# Patient Record
Sex: FEMALE | Race: NATIVE HAWAIIAN OR OTHER PACIFIC ISLANDER | Employment: UNEMPLOYED | URBAN - METROPOLITAN AREA
[De-identification: names, ages, dates, MRNs, and addresses within clinical notes are randomized per-mention and may not be internally consistent; named-entity substitution may affect disease eponyms.]

---

## 2017-07-29 ENCOUNTER — HOSPITAL ENCOUNTER (EMERGENCY)
Age: 22
Discharge: HOME | End: 2017-07-29
Attending: EMERGENCY MEDICINE
Payer: COMMERCIAL

## 2017-07-29 ENCOUNTER — APPOINTMENT (OUTPATIENT)
Dept: GENERAL RADIOLOGY | Age: 22
End: 2017-07-29
Payer: COMMERCIAL

## 2017-07-29 VITALS
BODY MASS INDEX: 13.81 KG/M2 | TEMPERATURE: 99.5 F | DIASTOLIC BLOOD PRESSURE: 71 MMHG | SYSTOLIC BLOOD PRESSURE: 125 MMHG | WEIGHT: 88 LBS | OXYGEN SATURATION: 100 % | HEART RATE: 96 BPM | RESPIRATION RATE: 16 BRPM | HEIGHT: 67 IN

## 2017-07-29 DIAGNOSIS — R07.89 CHEST WALL PAIN: Primary | ICD-10-CM

## 2017-07-29 PROCEDURE — 6370000000 HC RX 637 (ALT 250 FOR IP): Performed by: NURSE PRACTITIONER

## 2017-07-29 PROCEDURE — 71020 XR CHEST STANDARD TWO VW: CPT

## 2017-07-29 PROCEDURE — 99285 EMERGENCY DEPT VISIT HI MDM: CPT

## 2017-07-29 RX ORDER — IBUPROFEN 800 MG/1
800 TABLET ORAL EVERY 8 HOURS PRN
Qty: 20 TABLET | Refills: 0 | Status: ON HOLD | OUTPATIENT
Start: 2017-07-29 | End: 2018-06-18 | Stop reason: HOSPADM

## 2017-07-29 RX ORDER — HYDROCODONE BITARTRATE AND ACETAMINOPHEN 5; 325 MG/1; MG/1
1 TABLET ORAL EVERY 6 HOURS PRN
COMMUNITY
End: 2017-11-24 | Stop reason: CLARIF

## 2017-07-29 RX ORDER — OXYCODONE HYDROCHLORIDE AND ACETAMINOPHEN 5; 325 MG/1; MG/1
1 TABLET ORAL ONCE
Status: COMPLETED | OUTPATIENT
Start: 2017-07-29 | End: 2017-07-29

## 2017-07-29 RX ORDER — OXYCODONE HYDROCHLORIDE AND ACETAMINOPHEN 5; 325 MG/1; MG/1
1 TABLET ORAL EVERY 6 HOURS PRN
Qty: 10 TABLET | Refills: 0 | Status: SHIPPED | OUTPATIENT
Start: 2017-07-29 | End: 2017-11-24 | Stop reason: SINTOL

## 2017-07-29 RX ADMIN — OXYCODONE HYDROCHLORIDE AND ACETAMINOPHEN 1 TABLET: 5; 325 TABLET ORAL at 10:36

## 2017-07-29 ASSESSMENT — ENCOUNTER SYMPTOMS
SHORTNESS OF BREATH: 0
COUGH: 0
NAUSEA: 0
ABDOMINAL PAIN: 0
DIARRHEA: 0
VOMITING: 0

## 2017-07-29 ASSESSMENT — PAIN DESCRIPTION - FREQUENCY: FREQUENCY: CONTINUOUS

## 2017-07-29 ASSESSMENT — PAIN SCALES - GENERAL
PAINLEVEL_OUTOF10: 10
PAINLEVEL_OUTOF10: 10
PAINLEVEL_OUTOF10: 0

## 2017-07-29 ASSESSMENT — PAIN DESCRIPTION - DESCRIPTORS: DESCRIPTORS: CONSTANT

## 2017-07-29 ASSESSMENT — PAIN DESCRIPTION - PAIN TYPE: TYPE: ACUTE PAIN

## 2017-07-29 ASSESSMENT — PAIN DESCRIPTION - LOCATION: LOCATION: HEAD;NECK

## 2017-11-24 ENCOUNTER — APPOINTMENT (OUTPATIENT)
Dept: CT IMAGING | Age: 22
End: 2017-11-24
Payer: COMMERCIAL

## 2017-11-24 ENCOUNTER — HOSPITAL ENCOUNTER (EMERGENCY)
Age: 22
Discharge: HOME | End: 2017-11-24
Attending: EMERGENCY MEDICINE
Payer: COMMERCIAL

## 2017-11-24 VITALS
SYSTOLIC BLOOD PRESSURE: 101 MMHG | OXYGEN SATURATION: 100 % | TEMPERATURE: 99.4 F | RESPIRATION RATE: 16 BRPM | WEIGHT: 95 LBS | HEART RATE: 75 BPM | HEIGHT: 67 IN | DIASTOLIC BLOOD PRESSURE: 67 MMHG | BODY MASS INDEX: 14.91 KG/M2

## 2017-11-24 DIAGNOSIS — S05.00XA CORNEAL ABRASION, UNSPECIFIED LATERALITY, INITIAL ENCOUNTER: Primary | ICD-10-CM

## 2017-11-24 PROCEDURE — 99283 EMERGENCY DEPT VISIT LOW MDM: CPT

## 2017-11-24 PROCEDURE — 6370000000 HC RX 637 (ALT 250 FOR IP): Performed by: EMERGENCY MEDICINE

## 2017-11-24 PROCEDURE — 70480 CT ORBIT/EAR/FOSSA W/O DYE: CPT

## 2017-11-24 RX ORDER — POLYMYXIN B SULFATE AND TRIMETHOPRIM 1; 10000 MG/ML; [USP'U]/ML
1 SOLUTION OPHTHALMIC EVERY 4 HOURS
Qty: 1 BOTTLE | Refills: 0 | Status: SHIPPED | OUTPATIENT
Start: 2017-11-24 | End: 2017-12-04

## 2017-11-24 RX ORDER — OXYCODONE HYDROCHLORIDE AND ACETAMINOPHEN 5; 325 MG/1; MG/1
1 TABLET ORAL ONCE
Status: COMPLETED | OUTPATIENT
Start: 2017-11-24 | End: 2017-11-24

## 2017-11-24 RX ORDER — HYDROCODONE BITARTRATE AND ACETAMINOPHEN 5; 325 MG/1; MG/1
1 TABLET ORAL EVERY 6 HOURS PRN
Qty: 10 TABLET | Refills: 0 | Status: SHIPPED | OUTPATIENT
Start: 2017-11-24 | End: 2017-11-27

## 2017-11-24 RX ORDER — PROPARACAINE HYDROCHLORIDE 5 MG/ML
1 SOLUTION/ DROPS OPHTHALMIC ONCE
Status: DISCONTINUED | OUTPATIENT
Start: 2017-11-24 | End: 2017-11-24

## 2017-11-24 RX ORDER — TETRACAINE HYDROCHLORIDE 5 MG/ML
1 SOLUTION OPHTHALMIC ONCE
Status: COMPLETED | OUTPATIENT
Start: 2017-11-24 | End: 2017-11-24

## 2017-11-24 RX ADMIN — TETRACAINE HYDROCHLORIDE 1 DROP: 5 SOLUTION OPHTHALMIC at 17:27

## 2017-11-24 RX ADMIN — FLUORESCEIN SODIUM 1 MG: 1 STRIP OPHTHALMIC at 17:27

## 2017-11-24 RX ADMIN — OXYCODONE HYDROCHLORIDE AND ACETAMINOPHEN 1 TABLET: 5; 325 TABLET ORAL at 17:45

## 2017-11-24 ASSESSMENT — PAIN DESCRIPTION - ORIENTATION: ORIENTATION: LEFT

## 2017-11-24 ASSESSMENT — PAIN DESCRIPTION - DESCRIPTORS: DESCRIPTORS: THROBBING;STABBING

## 2017-11-24 ASSESSMENT — PAIN DESCRIPTION - PAIN TYPE: TYPE: ACUTE PAIN

## 2017-11-24 ASSESSMENT — PAIN DESCRIPTION - LOCATION: LOCATION: EYE

## 2017-11-24 ASSESSMENT — ENCOUNTER SYMPTOMS
SORE THROAT: 0
DIARRHEA: 0
ABDOMINAL PAIN: 0
VOMITING: 0
BACK PAIN: 0
EYE PAIN: 1
COUGH: 0
NAUSEA: 0
SHORTNESS OF BREATH: 0

## 2017-11-24 ASSESSMENT — PAIN SCALES - GENERAL
PAINLEVEL_OUTOF10: 8
PAINLEVEL_OUTOF10: 8

## 2017-11-24 ASSESSMENT — PAIN DESCRIPTION - FREQUENCY: FREQUENCY: CONTINUOUS
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
## 2017-12-23 ENCOUNTER — HOSPITAL ENCOUNTER (EMERGENCY)
Age: 22
Discharge: HOME | End: 2017-12-23
Attending: EMERGENCY MEDICINE
Payer: COMMERCIAL

## 2017-12-23 VITALS
RESPIRATION RATE: 14 BRPM | OXYGEN SATURATION: 100 % | HEIGHT: 66 IN | BODY MASS INDEX: 15.11 KG/M2 | WEIGHT: 94 LBS | SYSTOLIC BLOOD PRESSURE: 127 MMHG | TEMPERATURE: 98.6 F | DIASTOLIC BLOOD PRESSURE: 54 MMHG | HEART RATE: 109 BPM

## 2017-12-23 DIAGNOSIS — T78.40XA ALLERGIC REACTION, INITIAL ENCOUNTER: Primary | ICD-10-CM

## 2017-12-23 PROCEDURE — 99282 EMERGENCY DEPT VISIT SF MDM: CPT

## 2017-12-23 PROCEDURE — 6370000000 HC RX 637 (ALT 250 FOR IP): Performed by: PHYSICIAN ASSISTANT

## 2017-12-23 RX ORDER — CETIRIZINE HYDROCHLORIDE 10 MG/1
10 TABLET ORAL DAILY
Qty: 7 TABLET | Refills: 0 | Status: SHIPPED | OUTPATIENT
Start: 2017-12-23 | End: 2017-12-30

## 2017-12-23 RX ORDER — METHYLPREDNISOLONE SODIUM SUCCINATE 125 MG/2ML
125 INJECTION, POWDER, LYOPHILIZED, FOR SOLUTION INTRAMUSCULAR; INTRAVENOUS ONCE
Status: DISCONTINUED | OUTPATIENT
Start: 2017-12-23 | End: 2017-12-23

## 2017-12-23 RX ORDER — CETIRIZINE HYDROCHLORIDE 10 MG/1
10 TABLET ORAL DAILY
Status: DISCONTINUED | OUTPATIENT
Start: 2017-12-23 | End: 2017-12-23 | Stop reason: HOSPADM

## 2017-12-23 RX ADMIN — CETIRIZINE HYDROCHLORIDE 10 MG: 10 TABLET, FILM COATED ORAL at 15:56

## 2017-12-23 ASSESSMENT — ENCOUNTER SYMPTOMS
BACK PAIN: 0
SORE THROAT: 0
VOMITING: 0
CHOKING: 0
ABDOMINAL DISTENTION: 0
SHORTNESS OF BREATH: 0
NAUSEA: 0
CONSTIPATION: 0
WHEEZING: 0
STRIDOR: 0
ABDOMINAL PAIN: 0
COLOR CHANGE: 0
COUGH: 0
RHINORRHEA: 0
EYE DISCHARGE: 0

## 2017-12-23 NOTE — ED PROVIDER NOTES
[address] [facility] ED  eMERGENCY dEPARTMENT eNCOUnter      Pt Name: Carissa Ascencio  MRN: [id number]  [place] 1995  Date of evaluation: 12/23/2017  Provider: Mayelin Corbett PA-C    CHIEF COMPLAINT       Chief Complaint   Patient presents with    Urticaria     states on/off for 2 years. This episode began approx 2 weeks ago. States keeps getting worse. Denies any resp distress. HISTORY OF PRESENT ILLNESS   (Location/Symptom, Timing/Onset, Context/Setting, Quality, Duration, Modifying Factors, Severity)  Note limiting factors. Carissa Ascencio is a 23 y.o. female who presents to the emergency department With the complaint of rash which she's had on her entire body for approximately last 2 weeks. She states she's had similar rash before in the past, which she states lasted for over 3 years she was seen by the by her regular family physician and a dermatologist but never followed up for final determination of cause. Patient states the rash returned 2 weeks ago she cannot think of anything new that she's done she's not using any new medications no new soaps no new lotions no new clothing or bedding. She denies any shortness of breath any dizziness any wheezing, but states she has a constant rash across her face and arms hands chest back and legs. She states she has no regular family physician at this time for follow-up. HPI    Nursing Notes were reviewed. REVIEW OF SYSTEMS    (2-9 systems for level 4, 10 or more for level 5)     Review of Systems   Constitutional: Negative for activity change, appetite change, diaphoresis and fatigue. HENT: Negative for congestion, ear discharge, ear pain, nosebleeds, rhinorrhea and sore throat. Eyes: Negative for discharge. Respiratory: Negative for cough, choking, shortness of breath, wheezing and stridor. Cardiovascular: Negative for chest pain, palpitations and leg swelling.    Gastrointestinal: Negative for abdominal distention, abdominal and well-nourished. HENT:   Head: Normocephalic. Eyes: Pupils are equal, round, and reactive to light. Neck: Neck supple. No JVD present. No tracheal deviation present. Cardiovascular: Normal rate. Pulmonary/Chest: Effort normal and breath sounds normal. No stridor. No respiratory distress. She has no wheezes. She has no rales. She exhibits no tenderness. Abdominal: Soft. Bowel sounds are normal. She exhibits no distension and no mass. There is no tenderness. There is no rebound and no guarding. Musculoskeletal: She exhibits no edema or deformity. Lymphadenopathy:     She has no cervical adenopathy. Neurological: She is oriented to person, place, and time. Coordination normal.   Skin: Skin is warm and dry. Patient has generalized rash across her entire body face hands neck chest back legs abdomen which she states been ongoing for 2 weeks. It is erythematous in nature and takes on the presentation of urticaria. There is no blistering there is no scaling there is no ulcerations   Psychiatric: She has a normal mood and affect. DIAGNOSTIC RESULTS     EKG: All EKG's are interpreted by the Emergency Department Physician who either signs or Co-signs this chart in the absence of a cardiologist.        RADIOLOGY:   Non-plain film images such as CT, Ultrasound and MRI are read by the radiologist. Plain radiographic images are visualized and preliminarily interpreted by the emergency physician with the below findings:        Interpretation per the Radiologist below, if available at the time of this note:    No orders to display         ED BEDSIDE ULTRASOUND:   Performed by ED Physician - none    LABS:  Labs Reviewed - No data to display    All other labs were within normal range or not returned as of this dictation.     EMERGENCY DEPARTMENT COURSE and DIFFERENTIAL DIAGNOSIS/MDM:   Vitals:    Vitals:    12/23/17 1531   BP: (!) 127/54   Pulse: 109   Resp: 14   Temp: 98.6 °F (37 °C)   TempSrc: Oral SpO2: 100%   Weight: 94 lb (42.6 kg)   Height: 5' 6\" (1.676 m)         ED Course      MDM  Number of Diagnoses or Management Options  Allergic reaction, initial encounter:   Diagnosis management comments: Patient is undergoing rash which she's had for last 2 weeks she's had similar before in the past which lasted according the patient over 3 years. She also states that she is approximately 5 weeks pregnant at this point. She states she initially had seen a dermatologist never followed up for final diagnosis. She was given Zyrtec in the Emergency department . And will be discharged home with Zyrtec and advised to follow-up with Dr. Laina Mendoza  of immunology for further allergy testing. CRITICAL CARE TIME   Total Critical Care time was 0 minutes, excluding separately reportable procedures. There was a high probability of clinically significant/life threatening deterioration in the patient's condition which required my urgent intervention. CONSULTS:  None    PROCEDURES:  Unless otherwise noted below, none     Procedures    FINAL IMPRESSION      1.  Allergic reaction, initial encounter          DISPOSITION/PLAN   DISPOSITION        PATIENT REFERRED TO:  Anayeli Anne MD  [facility] [address] [phone]    In 1 week        DISCHARGE MEDICATIONS:  New Prescriptions    CETIRIZINE (ZYRTEC) 10 MG TABLET    Take 1 tablet by mouth daily for 7 days          (Please note that portions of this note were completed with a voice recognition program.  Efforts were made to edit the dictations but occasionally words are mis-transcribed.)    Yonathan Ward PA-C (electronically signed)  Attending Emergency Physician         Yonathan Ward PA-C  12/23/17 [address] [facility] Elvia Lewis PA-C  12/23/17 4473

## 2017-12-27 ENCOUNTER — HOSPITAL ENCOUNTER (OUTPATIENT)
Dept: ULTRASOUND IMAGING | Age: 22
Discharge: HOME | End: 2017-12-27
Payer: COMMERCIAL

## 2017-12-27 DIAGNOSIS — Z34.91 ENCOUNTER FOR SUPERVISION OF NORMAL PREGNANCY IN FIRST TRIMESTER, UNSPECIFIED GRAVIDITY: ICD-10-CM

## 2017-12-27 PROCEDURE — 76801 OB US < 14 WKS SINGLE FETUS: CPT

## 2018-01-15 ENCOUNTER — APPOINTMENT (OUTPATIENT)
Dept: ULTRASOUND IMAGING | Age: 23
End: 2018-01-15
Payer: COMMERCIAL

## 2018-01-15 ENCOUNTER — HOSPITAL ENCOUNTER (EMERGENCY)
Age: 23
Discharge: HOME | End: 2018-01-15
Attending: EMERGENCY MEDICINE
Payer: COMMERCIAL

## 2018-01-15 VITALS
SYSTOLIC BLOOD PRESSURE: 117 MMHG | TEMPERATURE: 98 F | WEIGHT: 94 LBS | RESPIRATION RATE: 18 BRPM | DIASTOLIC BLOOD PRESSURE: 72 MMHG | OXYGEN SATURATION: 100 % | BODY MASS INDEX: 15.11 KG/M2 | HEIGHT: 66 IN | HEART RATE: 77 BPM

## 2018-01-15 DIAGNOSIS — O46.8X1 SUBCHORIONIC HEMATOMA IN FIRST TRIMESTER, SINGLE OR UNSPECIFIED FETUS: ICD-10-CM

## 2018-01-15 DIAGNOSIS — N93.9 VAGINAL BLEEDING: Primary | ICD-10-CM

## 2018-01-15 DIAGNOSIS — O41.8X10 SUBCHORIONIC HEMATOMA IN FIRST TRIMESTER, SINGLE OR UNSPECIFIED FETUS: ICD-10-CM

## 2018-01-15 LAB
ALBUMIN SERPL-MCNC: 4.8 G/DL (ref 3.9–4.9)
ALP BLD-CCNC: 90 U/L (ref 40–130)
ALT SERPL-CCNC: 15 U/L (ref 0–33)
ANION GAP SERPL CALCULATED.3IONS-SCNC: 16 MEQ/L (ref 7–13)
AST SERPL-CCNC: 16 U/L (ref 0–35)
BASOPHILS ABSOLUTE: 0 K/UL (ref 0–0.2)
BASOPHILS RELATIVE PERCENT: 0.5 %
BILIRUB SERPL-MCNC: 1 MG/DL (ref 0–1.2)
BUN BLDV-MCNC: 8 MG/DL (ref 6–20)
CALCIUM SERPL-MCNC: 9.8 MG/DL (ref 8.6–10.2)
CHLORIDE BLD-SCNC: 98 MEQ/L (ref 98–107)
CO2: 21 MEQ/L (ref 22–29)
CREAT SERPL-MCNC: 0.35 MG/DL (ref 0.5–0.9)
EOSINOPHILS ABSOLUTE: 0 K/UL (ref 0–0.7)
EOSINOPHILS RELATIVE PERCENT: 0.4 %
GFR AFRICAN AMERICAN: >60
GFR NON-AFRICAN AMERICAN: >60
GLOBULIN: 3.2 G/DL (ref 2.3–3.5)
GLUCOSE BLD-MCNC: 87 MG/DL (ref 74–109)
GONADOTROPIN, CHORIONIC (HCG) QUANT: NORMAL MIU/ML
HCT VFR BLD CALC: 39.6 % (ref 37–47)
HEMOGLOBIN: 12.9 G/DL (ref 12–16)
LYMPHOCYTES ABSOLUTE: 1.4 K/UL (ref 1–4.8)
LYMPHOCYTES RELATIVE PERCENT: 19.7 %
MCH RBC QN AUTO: 26.9 PG (ref 27–31.3)
MCHC RBC AUTO-ENTMCNC: 32.6 % (ref 33–37)
MCV RBC AUTO: 82.6 FL (ref 82–100)
MONOCYTES ABSOLUTE: 0.5 K/UL (ref 0.2–0.8)
MONOCYTES RELATIVE PERCENT: 6.9 %
NEUTROPHILS ABSOLUTE: 5.2 K/UL (ref 1.4–6.5)
NEUTROPHILS RELATIVE PERCENT: 72.5 %
PDW BLD-RTO: 18.8 % (ref 11.5–14.5)
PLATELET # BLD: 328 K/UL (ref 130–400)
POTASSIUM SERPL-SCNC: 4.6 MEQ/L (ref 3.5–5.1)
RBC # BLD: 4.79 M/UL (ref 4.2–5.4)
SODIUM BLD-SCNC: 135 MEQ/L (ref 132–144)
TOTAL PROTEIN: 8 G/DL (ref 6.4–8.1)
WBC # BLD: 7.1 K/UL (ref 4.5–11)

## 2018-01-15 PROCEDURE — 99284 EMERGENCY DEPT VISIT MOD MDM: CPT

## 2018-01-15 PROCEDURE — 76817 TRANSVAGINAL US OBSTETRIC: CPT

## 2018-01-15 PROCEDURE — 2580000003 HC RX 258: Performed by: EMERGENCY MEDICINE

## 2018-01-15 PROCEDURE — 76801 OB US < 14 WKS SINGLE FETUS: CPT

## 2018-01-15 PROCEDURE — 36415 COLL VENOUS BLD VENIPUNCTURE: CPT

## 2018-01-15 PROCEDURE — 80053 COMPREHEN METABOLIC PANEL: CPT

## 2018-01-15 PROCEDURE — 85025 COMPLETE CBC W/AUTO DIFF WBC: CPT

## 2018-01-15 PROCEDURE — 84702 CHORIONIC GONADOTROPIN TEST: CPT

## 2018-01-15 RX ORDER — 0.9 % SODIUM CHLORIDE 0.9 %
1000 INTRAVENOUS SOLUTION INTRAVENOUS ONCE
Status: COMPLETED | OUTPATIENT
Start: 2018-01-15 | End: 2018-01-15

## 2018-01-15 RX ORDER — SODIUM CHLORIDE 9 MG/ML
INJECTION, SOLUTION INTRAVENOUS
Status: DISCONTINUED
Start: 2018-01-15 | End: 2018-01-15 | Stop reason: HOSPADM

## 2018-01-15 RX ADMIN — SODIUM CHLORIDE 1000 ML: 9 INJECTION, SOLUTION INTRAVENOUS at 15:44

## 2018-01-15 ASSESSMENT — ENCOUNTER SYMPTOMS
VOMITING: 0
SHORTNESS OF BREATH: 0
NAUSEA: 0
SORE THROAT: 0
ABDOMINAL PAIN: 0
COUGH: 0
BACK PAIN: 0
DIARRHEA: 0

## 2018-01-15 NOTE — ED PROVIDER NOTES
status: Current Some Day Smoker     Types: Cigarettes, Cigars    Smokeless tobacco: Never Used      Comment: black n milds    Alcohol use No    Drug use: No    Sexual activity: Yes     Partners: Male     Other Topics Concern    None     Social History Narrative    None         PHYSICAL EXAM       ED Triage Vitals [01/15/18 1449]   BP Temp Temp Source Pulse Resp SpO2 Height Weight   125/81 98 °F (36.7 °C) Oral 88 16 100 % 5' 6\" (1.676 m) 94 lb (42.6 kg)       Physical Exam   Constitutional: She is oriented to person, place, and time. She appears well-developed. HENT:   Head: Normocephalic. Right Ear: External ear normal.   Left Ear: External ear normal.   Mouth/Throat: Oropharynx is clear and moist.   Eyes: Conjunctivae are normal. Pupils are equal, round, and reactive to light. Neck: Normal range of motion. Neck supple. Cardiovascular: Normal rate, regular rhythm and normal heart sounds. Pulmonary/Chest: Effort normal and breath sounds normal.   Abdominal: Soft. Bowel sounds are normal. She exhibits no distension. There is no tenderness. Musculoskeletal: Normal range of motion. Neurological: She is alert and oriented to person, place, and time. Skin: Skin is warm and dry. Psychiatric: She has a normal mood and affect. Nursing note and vitals reviewed. MDM  24 yo female presents to the ED with vaginal bleeding. Pt given 1 L NS. No ab cramping. Labs only remarkable for hcg [id number]. US shows single IUP at 10 wks 0 days with subchorionic hemorrhage. Pt educated about subchorionic hemorrhages, advised on bed rest, given vaginal bleeding in pregnancy warning signs and f/u with ob. Pt understands plan. FINAL IMPRESSION      1. Vaginal bleeding    2.  Subchorionic hematoma in first trimester, single or unspecified fetus          DISPOSITION/PLAN   DISPOSITION Decision To Discharge 01/15/2018 05:28:04 PM        DISCHARGE MEDICATIONS:  New Prescriptions    No medications on file

## 2018-01-15 NOTE — ED TRIAGE NOTES
Pt went to pcp for regular check up and during vaginal exam membrane and bleeding was found in vaginal cavity. Pt is a+o x4 msps intact lungs clear bilat. Pt denies any pain at this time. Skin warm pink and dry. Pt denies any recent fever chills cough or cold.

## 2018-05-18 ENCOUNTER — HOSPITAL ENCOUNTER (OUTPATIENT)
Age: 23
Setting detail: OBSERVATION
Discharge: HOME | End: 2018-05-20
Attending: OBSTETRICS & GYNECOLOGY | Admitting: OBSTETRICS & GYNECOLOGY
Payer: COMMERCIAL

## 2018-05-18 LAB
ABO/RH: NORMAL
ALBUMIN SERPL-MCNC: 3.2 G/DL (ref 3.9–4.9)
ALP BLD-CCNC: 132 U/L (ref 40–130)
ALT SERPL-CCNC: <5 U/L (ref 0–33)
AMORPHOUS: NORMAL
AMPHETAMINE SCREEN, URINE: NORMAL
ANION GAP SERPL CALCULATED.3IONS-SCNC: 16 MEQ/L (ref 7–13)
ANISOCYTOSIS: ABNORMAL
ANTIBODY SCREEN: NORMAL
APTT: 25.2 SEC (ref 21.6–35.4)
AST SERPL-CCNC: 12 U/L (ref 0–35)
BACTERIA: NORMAL /HPF
BANDED NEUTROPHILS RELATIVE PERCENT: 10 % (ref 5–11)
BARBITURATE SCREEN URINE: NORMAL
BASOPHILS ABSOLUTE: 0 K/UL (ref 0–0.2)
BASOPHILS RELATIVE PERCENT: 1 %
BENZODIAZEPINE SCREEN, URINE: NORMAL
BILIRUB SERPL-MCNC: <0.2 MG/DL (ref 0–1.2)
BILIRUBIN URINE: NEGATIVE
BLOOD, URINE: NEGATIVE
BUN BLDV-MCNC: 6 MG/DL (ref 6–20)
CALCIUM SERPL-MCNC: 8 MG/DL (ref 8.6–10.2)
CANNABINOID SCREEN URINE: NORMAL
CHLORIDE BLD-SCNC: 104 MEQ/L (ref 98–107)
CLARITY: CLEAR
CO2: 20 MEQ/L (ref 22–29)
COCAINE METABOLITE SCREEN URINE: NORMAL
COLOR: YELLOW
CREAT SERPL-MCNC: 0.35 MG/DL (ref 0.5–0.9)
EOSINOPHILS ABSOLUTE: 0 K/UL (ref 0–0.7)
EOSINOPHILS RELATIVE PERCENT: 0.6 %
EPITHELIAL CELLS, UA: NORMAL /HPF
GFR AFRICAN AMERICAN: >60
GFR NON-AFRICAN AMERICAN: >60
GLOBULIN: 2.8 G/DL (ref 2.3–3.5)
GLUCOSE BLD-MCNC: 83 MG/DL (ref 74–109)
GLUCOSE URINE: NEGATIVE MG/DL
HCT VFR BLD CALC: 31.6 % (ref 37–47)
HEMOGLOBIN: 10.2 G/DL (ref 12–16)
HYPOCHROMIA: ABNORMAL
INR BLD: 0.9
KETONES, URINE: NEGATIVE MG/DL
LEUKOCYTE ESTERASE, URINE: ABNORMAL
LYMPHOCYTES ABSOLUTE: 1.3 K/UL (ref 1–4.8)
LYMPHOCYTES RELATIVE PERCENT: 18 %
Lab: NORMAL
MCH RBC QN AUTO: 23.5 PG (ref 27–31.3)
MCHC RBC AUTO-ENTMCNC: 32.2 % (ref 33–37)
MCV RBC AUTO: 73 FL (ref 82–100)
MICROCYTES: ABNORMAL
MONOCYTES ABSOLUTE: 0.3 K/UL (ref 0.2–0.8)
MONOCYTES RELATIVE PERCENT: 3.7 %
MUCUS: PRESENT
NEUTROPHILS ABSOLUTE: 5.5 K/UL (ref 1.4–6.5)
NEUTROPHILS RELATIVE PERCENT: 68 %
NITRITE, URINE: NEGATIVE
OPIATE SCREEN URINE: NORMAL
PDW BLD-RTO: 16.4 % (ref 11.5–14.5)
PH UA: 7 (ref 5–9)
PHENCYCLIDINE SCREEN URINE: NORMAL
PLATELET # BLD: 321 K/UL (ref 130–400)
PLATELET SLIDE REVIEW: NORMAL
POIKILOCYTES: ABNORMAL
POLYCHROMASIA: ABNORMAL
POTASSIUM SERPL-SCNC: 4.3 MEQ/L (ref 3.5–5.1)
PROTEIN UA: 30 MG/DL
PROTHROMBIN TIME: 9.2 SEC (ref 9.6–12.3)
RBC # BLD: 4.33 M/UL (ref 4.2–5.4)
RBC UA: NORMAL /HPF (ref 0–2)
SODIUM BLD-SCNC: 140 MEQ/L (ref 132–144)
SPECIFIC GRAVITY UA: 1.02 (ref 1–1.03)
TOTAL PROTEIN: 6 G/DL (ref 6.4–8.1)
URIC ACID, SERUM: 2.5 MG/DL (ref 2.4–5.7)
UROBILINOGEN, URINE: 0.2 E.U./DL
WBC # BLD: 7 K/UL (ref 4.5–11)
WBC UA: NORMAL /HPF (ref 0–5)

## 2018-05-18 PROCEDURE — 99220 PR INITIAL OBSERVATION CARE/DAY 70 MINUTES: CPT | Performed by: OBSTETRICS & GYNECOLOGY

## 2018-05-18 PROCEDURE — 80053 COMPREHEN METABOLIC PANEL: CPT

## 2018-05-18 PROCEDURE — 86900 BLOOD TYPING SEROLOGIC ABO: CPT

## 2018-05-18 PROCEDURE — 1220000000 HC SEMI PRIVATE OB R&B

## 2018-05-18 PROCEDURE — 85025 COMPLETE CBC W/AUTO DIFF WBC: CPT

## 2018-05-18 PROCEDURE — 80307 DRUG TEST PRSMV CHEM ANLYZR: CPT

## 2018-05-18 PROCEDURE — 85730 THROMBOPLASTIN TIME PARTIAL: CPT

## 2018-05-18 PROCEDURE — 81001 URINALYSIS AUTO W/SCOPE: CPT

## 2018-05-18 PROCEDURE — 84156 ASSAY OF PROTEIN URINE: CPT

## 2018-05-18 PROCEDURE — 84550 ASSAY OF BLOOD/URIC ACID: CPT

## 2018-05-18 PROCEDURE — 96372 THER/PROPH/DIAG INJ SC/IM: CPT

## 2018-05-18 PROCEDURE — 6360000002 HC RX W HCPCS: Performed by: OBSTETRICS & GYNECOLOGY

## 2018-05-18 PROCEDURE — 86901 BLOOD TYPING SEROLOGIC RH(D): CPT

## 2018-05-18 PROCEDURE — 36415 COLL VENOUS BLD VENIPUNCTURE: CPT

## 2018-05-18 PROCEDURE — 86850 RBC ANTIBODY SCREEN: CPT

## 2018-05-18 PROCEDURE — 85610 PROTHROMBIN TIME: CPT

## 2018-05-18 RX ORDER — ONDANSETRON 2 MG/ML
4 INJECTION INTRAMUSCULAR; INTRAVENOUS EVERY 6 HOURS PRN
Status: DISCONTINUED | OUTPATIENT
Start: 2018-05-18 | End: 2018-05-20 | Stop reason: HOSPADM

## 2018-05-18 RX ORDER — FERROUS SULFATE 325(65) MG
325 TABLET ORAL 2 TIMES DAILY WITH MEALS
Status: DISCONTINUED | OUTPATIENT
Start: 2018-05-19 | End: 2018-05-20 | Stop reason: HOSPADM

## 2018-05-18 RX ORDER — ACETAMINOPHEN 325 MG/1
650 TABLET ORAL EVERY 4 HOURS PRN
Status: DISCONTINUED | OUTPATIENT
Start: 2018-05-18 | End: 2018-05-20 | Stop reason: HOSPADM

## 2018-05-18 RX ORDER — LABETALOL 100 MG/1
100 TABLET, FILM COATED ORAL EVERY 12 HOURS SCHEDULED
Status: DISCONTINUED | OUTPATIENT
Start: 2018-05-18 | End: 2018-05-20 | Stop reason: HOSPADM

## 2018-05-18 RX ORDER — SODIUM CHLORIDE 0.9 % (FLUSH) 0.9 %
10 SYRINGE (ML) INJECTION PRN
Status: DISCONTINUED | OUTPATIENT
Start: 2018-05-18 | End: 2018-05-20 | Stop reason: HOSPADM

## 2018-05-18 RX ORDER — BETAMETHASONE SODIUM PHOSPHATE AND BETAMETHASONE ACETATE 3; 3 MG/ML; MG/ML
12 INJECTION, SUSPENSION INTRA-ARTICULAR; INTRALESIONAL; INTRAMUSCULAR; SOFT TISSUE ONCE
Status: COMPLETED | OUTPATIENT
Start: 2018-05-19 | End: 2018-05-19

## 2018-05-18 RX ORDER — SODIUM CHLORIDE 0.9 % (FLUSH) 0.9 %
10 SYRINGE (ML) INJECTION EVERY 12 HOURS SCHEDULED
Status: DISCONTINUED | OUTPATIENT
Start: 2018-05-18 | End: 2018-05-20 | Stop reason: HOSPADM

## 2018-05-18 RX ORDER — BETAMETHASONE SODIUM PHOSPHATE AND BETAMETHASONE ACETATE 3; 3 MG/ML; MG/ML
12 INJECTION, SUSPENSION INTRA-ARTICULAR; INTRALESIONAL; INTRAMUSCULAR; SOFT TISSUE ONCE
Status: COMPLETED | OUTPATIENT
Start: 2018-05-18 | End: 2018-05-18

## 2018-05-18 RX ORDER — PRENATAL VIT/IRON FUM/FOLIC AC 27MG-0.8MG
1 TABLET ORAL DAILY
Status: DISCONTINUED | OUTPATIENT
Start: 2018-05-19 | End: 2018-05-20 | Stop reason: HOSPADM

## 2018-05-18 RX ADMIN — BETAMETHASONE SODIUM PHOSPHATE AND BETAMETHASONE ACETATE 12 MG: 3; 3 INJECTION, SUSPENSION INTRA-ARTICULAR; INTRALESIONAL; INTRAMUSCULAR at 23:10

## 2018-05-19 ENCOUNTER — APPOINTMENT (OUTPATIENT)
Dept: ULTRASOUND IMAGING | Age: 23
End: 2018-05-19
Payer: COMMERCIAL

## 2018-05-19 VITALS
TEMPERATURE: 98.5 F | SYSTOLIC BLOOD PRESSURE: 121 MMHG | RESPIRATION RATE: 20 BRPM | DIASTOLIC BLOOD PRESSURE: 74 MMHG | HEIGHT: 67 IN | BODY MASS INDEX: 17.94 KG/M2 | WEIGHT: 114.31 LBS | HEART RATE: 94 BPM

## 2018-05-19 PROBLEM — O47.00 THREATENED PRETERM LABOR, ANTEPARTUM: Status: ACTIVE | Noted: 2018-05-19

## 2018-05-19 PROCEDURE — G0378 HOSPITAL OBSERVATION PER HR: HCPCS

## 2018-05-19 PROCEDURE — 6370000000 HC RX 637 (ALT 250 FOR IP): Performed by: OBSTETRICS & GYNECOLOGY

## 2018-05-19 PROCEDURE — 2580000003 HC RX 258: Performed by: OBSTETRICS & GYNECOLOGY

## 2018-05-19 PROCEDURE — 76805 OB US >/= 14 WKS SNGL FETUS: CPT

## 2018-05-19 PROCEDURE — 76817 TRANSVAGINAL US OBSTETRIC: CPT

## 2018-05-19 PROCEDURE — 96372 THER/PROPH/DIAG INJ SC/IM: CPT

## 2018-05-19 PROCEDURE — 82575 CREATININE CLEARANCE TEST: CPT

## 2018-05-19 PROCEDURE — 6360000002 HC RX W HCPCS: Performed by: OBSTETRICS & GYNECOLOGY

## 2018-05-19 RX ADMIN — PRENATAL VIT W/ FE FUMARATE-FA TAB 27-0.8 MG 1 TABLET: 27-0.8 TAB at 08:31

## 2018-05-19 RX ADMIN — ONDANSETRON 4 MG: 2 INJECTION INTRAMUSCULAR; INTRAVENOUS at 17:48

## 2018-05-19 RX ADMIN — Medication 10 ML: at 16:45

## 2018-05-19 RX ADMIN — Medication 10 ML: at 17:48

## 2018-05-19 RX ADMIN — Medication 10 ML: at 17:53

## 2018-05-19 RX ADMIN — FERROUS SULFATE TAB 325 MG (65 MG ELEMENTAL FE) 325 MG: 325 (65 FE) TAB at 08:30

## 2018-05-19 RX ADMIN — BETAMETHASONE SODIUM PHOSPHATE AND BETAMETHASONE ACETATE 12 MG: 3; 3 INJECTION, SUSPENSION INTRA-ARTICULAR; INTRALESIONAL; INTRAMUSCULAR at 23:30

## 2018-05-20 LAB
24HR URINE VOLUME (ML): 800 ML
24HR URINE VOLUME (ML): 800 ML
CREAT SERPL-MCNC: 0.35 MG/DL
CREATININE 24 HOUR URINE: 1.06 G/TV (ref 0.8–1.5)
CREATININE 24 HOUR URINE: 1.1 G/TV (ref 0.8–1.5)
CREATININE CLEARANCE: 363.8 ML/MIN (ref 86–140)
CREATININE URINE: 132.5 MG/DL
CREATININE URINE: 136.9 MG/DL
Lab: 24 HR
PROTEIN 24 HOUR URINE: 512 MG/TV (ref 0–200)

## 2018-05-20 PROCEDURE — 59025 FETAL NON-STRESS TEST: CPT

## 2018-05-20 PROCEDURE — G0378 HOSPITAL OBSERVATION PER HR: HCPCS

## 2018-06-18 ENCOUNTER — HOSPITAL ENCOUNTER (OUTPATIENT)
Age: 23
Discharge: HOME | End: 2018-06-18
Attending: OBSTETRICS & GYNECOLOGY | Admitting: OBSTETRICS & GYNECOLOGY
Payer: COMMERCIAL

## 2018-06-18 ENCOUNTER — APPOINTMENT (OUTPATIENT)
Dept: ULTRASOUND IMAGING | Age: 23
End: 2018-06-18
Payer: COMMERCIAL

## 2018-06-18 VITALS
TEMPERATURE: 98.2 F | SYSTOLIC BLOOD PRESSURE: 166 MMHG | OXYGEN SATURATION: 100 % | HEART RATE: 76 BPM | DIASTOLIC BLOOD PRESSURE: 112 MMHG | RESPIRATION RATE: 18 BRPM

## 2018-06-18 LAB
ABO/RH: NORMAL
ALBUMIN SERPL-MCNC: 2.6 G/DL (ref 3.9–4.9)
ALP BLD-CCNC: 134 U/L (ref 40–130)
ALT SERPL-CCNC: 7 U/L (ref 0–33)
AMPHETAMINE SCREEN, URINE: NORMAL
ANION GAP SERPL CALCULATED.3IONS-SCNC: 14 MEQ/L (ref 7–13)
ANISOCYTOSIS: ABNORMAL
ANTIBODY SCREEN: NORMAL
APTT: 24.8 SEC (ref 21.6–35.4)
AST SERPL-CCNC: 15 U/L (ref 0–35)
BACTERIA: ABNORMAL /HPF
BARBITURATE SCREEN URINE: NORMAL
BASOPHILS ABSOLUTE: 0.1 K/UL (ref 0–0.2)
BASOPHILS RELATIVE PERCENT: 0.7 %
BENZODIAZEPINE SCREEN, URINE: NORMAL
BILIRUB SERPL-MCNC: 0.3 MG/DL (ref 0–1.2)
BILIRUBIN URINE: NEGATIVE
BLOOD, URINE: ABNORMAL
BUN BLDV-MCNC: 10 MG/DL (ref 6–20)
CALCIUM SERPL-MCNC: 7.9 MG/DL (ref 8.6–10.2)
CANNABINOID SCREEN URINE: NORMAL
CASTS: ABNORMAL /LPF
CHLORIDE BLD-SCNC: 103 MEQ/L (ref 98–107)
CLARITY: ABNORMAL
CO2: 19 MEQ/L (ref 22–29)
COCAINE METABOLITE SCREEN URINE: NORMAL
COLOR: ABNORMAL
CREAT SERPL-MCNC: 0.54 MG/DL (ref 0.5–0.9)
EOSINOPHILS ABSOLUTE: 0 K/UL (ref 0–0.7)
EOSINOPHILS RELATIVE PERCENT: 0.5 %
EPITHELIAL CELLS, UA: ABNORMAL /HPF
FIBRINOGEN: 437.8 MG/DL (ref 200–400)
GFR AFRICAN AMERICAN: >60
GFR NON-AFRICAN AMERICAN: >60
GLOBULIN: 2.6 G/DL (ref 2.3–3.5)
GLUCOSE BLD-MCNC: 70 MG/DL (ref 74–109)
GLUCOSE URINE: NEGATIVE MG/DL
HCT VFR BLD CALC: 38 % (ref 37–47)
HEMOGLOBIN: 11.9 G/DL (ref 12–16)
INR BLD: 0.9
KETONES, URINE: ABNORMAL MG/DL
LEUKOCYTE ESTERASE, URINE: ABNORMAL
LYMPHOCYTES ABSOLUTE: 2.3 K/UL (ref 1–4.8)
LYMPHOCYTES RELATIVE PERCENT: 31.5 %
Lab: NORMAL
MCH RBC QN AUTO: 23.2 PG (ref 27–31.3)
MCHC RBC AUTO-ENTMCNC: 31.3 % (ref 33–37)
MCV RBC AUTO: 74 FL (ref 82–100)
MICROCYTES: ABNORMAL
MONOCYTES ABSOLUTE: 0.6 K/UL (ref 0.2–0.8)
MONOCYTES RELATIVE PERCENT: 7.8 %
MUCUS: PRESENT
NEUTROPHILS ABSOLUTE: 4.3 K/UL (ref 1.4–6.5)
NEUTROPHILS RELATIVE PERCENT: 59.5 %
NITRITE, URINE: NEGATIVE
OPIATE SCREEN URINE: NORMAL
PDW BLD-RTO: 20.8 % (ref 11.5–14.5)
PH UA: 6.5 (ref 5–9)
PHENCYCLIDINE SCREEN URINE: NORMAL
PLATELET # BLD: 373 K/UL (ref 130–400)
POIKILOCYTES: ABNORMAL
POTASSIUM SERPL-SCNC: 4.8 MEQ/L (ref 3.5–5.1)
PROTEIN UA: >=300 MG/DL
PROTHROMBIN TIME: 9.2 SEC (ref 9.6–12.3)
RBC # BLD: 5.14 M/UL (ref 4.2–5.4)
RBC UA: ABNORMAL /HPF (ref 0–2)
SODIUM BLD-SCNC: 136 MEQ/L (ref 132–144)
SPECIFIC GRAVITY UA: 1.06 (ref 1–1.03)
TOTAL PROTEIN: 5.2 G/DL (ref 6.4–8.1)
URIC ACID, SERUM: 4.3 MG/DL (ref 2.4–5.7)
UROBILINOGEN, URINE: 0.2 E.U./DL
WBC # BLD: 7.2 K/UL (ref 4.5–11)
WBC UA: ABNORMAL /HPF (ref 0–5)

## 2018-06-18 PROCEDURE — 99219 PR INITIAL OBSERVATION CARE/DAY 50 MINUTES: CPT | Performed by: OBSTETRICS & GYNECOLOGY

## 2018-06-18 PROCEDURE — 84550 ASSAY OF BLOOD/URIC ACID: CPT

## 2018-06-18 PROCEDURE — 86901 BLOOD TYPING SEROLOGIC RH(D): CPT

## 2018-06-18 PROCEDURE — 85025 COMPLETE CBC W/AUTO DIFF WBC: CPT

## 2018-06-18 PROCEDURE — 81001 URINALYSIS AUTO W/SCOPE: CPT

## 2018-06-18 PROCEDURE — 99285 EMERGENCY DEPT VISIT HI MDM: CPT

## 2018-06-18 PROCEDURE — 2580000003 HC RX 258: Performed by: OBSTETRICS & GYNECOLOGY

## 2018-06-18 PROCEDURE — 2500000003 HC RX 250 WO HCPCS: Performed by: OBSTETRICS & GYNECOLOGY

## 2018-06-18 PROCEDURE — 2580000003 HC RX 258

## 2018-06-18 PROCEDURE — 80053 COMPREHEN METABOLIC PANEL: CPT

## 2018-06-18 PROCEDURE — 85610 PROTHROMBIN TIME: CPT

## 2018-06-18 PROCEDURE — 6360000002 HC RX W HCPCS: Performed by: OBSTETRICS & GYNECOLOGY

## 2018-06-18 PROCEDURE — 96372 THER/PROPH/DIAG INJ SC/IM: CPT

## 2018-06-18 PROCEDURE — 80307 DRUG TEST PRSMV CHEM ANLYZR: CPT

## 2018-06-18 PROCEDURE — 86900 BLOOD TYPING SEROLOGIC ABO: CPT

## 2018-06-18 PROCEDURE — 86850 RBC ANTIBODY SCREEN: CPT

## 2018-06-18 PROCEDURE — 85730 THROMBOPLASTIN TIME PARTIAL: CPT

## 2018-06-18 PROCEDURE — 6360000002 HC RX W HCPCS

## 2018-06-18 PROCEDURE — 76805 OB US >/= 14 WKS SNGL FETUS: CPT

## 2018-06-18 PROCEDURE — 96376 TX/PRO/DX INJ SAME DRUG ADON: CPT

## 2018-06-18 PROCEDURE — 36415 COLL VENOUS BLD VENIPUNCTURE: CPT

## 2018-06-18 PROCEDURE — 96375 TX/PRO/DX INJ NEW DRUG ADDON: CPT

## 2018-06-18 PROCEDURE — 85384 FIBRINOGEN ACTIVITY: CPT

## 2018-06-18 PROCEDURE — 96374 THER/PROPH/DIAG INJ IV PUSH: CPT

## 2018-06-18 RX ORDER — LABETALOL HYDROCHLORIDE 5 MG/ML
10 INJECTION, SOLUTION INTRAVENOUS ONCE
Status: COMPLETED | OUTPATIENT
Start: 2018-06-18 | End: 2018-06-18

## 2018-06-18 RX ORDER — SODIUM CHLORIDE, SODIUM LACTATE, POTASSIUM CHLORIDE, CALCIUM CHLORIDE 600; 310; 30; 20 MG/100ML; MG/100ML; MG/100ML; MG/100ML
INJECTION, SOLUTION INTRAVENOUS
Status: COMPLETED
Start: 2018-06-18 | End: 2018-06-18

## 2018-06-18 RX ORDER — MAGNESIUM SULFATE IN WATER 40 MG/ML
4 INJECTION, SOLUTION INTRAVENOUS ONCE
Status: COMPLETED | OUTPATIENT
Start: 2018-06-18 | End: 2018-06-18

## 2018-06-18 RX ORDER — SODIUM CHLORIDE, SODIUM LACTATE, POTASSIUM CHLORIDE, CALCIUM CHLORIDE 600; 310; 30; 20 MG/100ML; MG/100ML; MG/100ML; MG/100ML
INJECTION, SOLUTION INTRAVENOUS CONTINUOUS
Status: DISCONTINUED | OUTPATIENT
Start: 2018-06-18 | End: 2018-06-18 | Stop reason: HOSPADM

## 2018-06-18 RX ORDER — ONDANSETRON 2 MG/ML
4 INJECTION INTRAMUSCULAR; INTRAVENOUS EVERY 6 HOURS PRN
Status: DISCONTINUED | OUTPATIENT
Start: 2018-06-18 | End: 2018-06-18 | Stop reason: HOSPADM

## 2018-06-18 RX ORDER — MAGNESIUM SULFATE IN WATER 40 MG/ML
INJECTION, SOLUTION INTRAVENOUS
Status: COMPLETED
Start: 2018-06-18 | End: 2018-06-18

## 2018-06-18 RX ORDER — SODIUM CHLORIDE 0.9 % (FLUSH) 0.9 %
10 SYRINGE (ML) INJECTION EVERY 12 HOURS SCHEDULED
Status: DISCONTINUED | OUTPATIENT
Start: 2018-06-18 | End: 2018-06-18 | Stop reason: HOSPADM

## 2018-06-18 RX ORDER — SODIUM CHLORIDE 0.9 % (FLUSH) 0.9 %
10 SYRINGE (ML) INJECTION PRN
Status: DISCONTINUED | OUTPATIENT
Start: 2018-06-18 | End: 2018-06-18 | Stop reason: HOSPADM

## 2018-06-18 RX ORDER — BETAMETHASONE SODIUM PHOSPHATE AND BETAMETHASONE ACETATE 3; 3 MG/ML; MG/ML
12 INJECTION, SUSPENSION INTRA-ARTICULAR; INTRALESIONAL; INTRAMUSCULAR; SOFT TISSUE ONCE
Status: COMPLETED | OUTPATIENT
Start: 2018-06-18 | End: 2018-06-18

## 2018-06-18 RX ORDER — ACETAMINOPHEN 325 MG/1
650 TABLET ORAL EVERY 4 HOURS PRN
Status: DISCONTINUED | OUTPATIENT
Start: 2018-06-18 | End: 2018-06-18 | Stop reason: HOSPADM

## 2018-06-18 RX ADMIN — MAGNESIUM SULFATE HEPTAHYDRATE 2 G/HR: 40 INJECTION, SOLUTION INTRAVENOUS at 12:50

## 2018-06-18 RX ADMIN — LABETALOL HYDROCHLORIDE 10 MG: 5 INJECTION, SOLUTION INTRAVENOUS at 14:50

## 2018-06-18 RX ADMIN — SODIUM CHLORIDE, POTASSIUM CHLORIDE, SODIUM LACTATE AND CALCIUM CHLORIDE: 600; 310; 30; 20 INJECTION, SOLUTION INTRAVENOUS at 12:15

## 2018-06-18 RX ADMIN — MAGNESIUM SULFATE IN WATER 4 G: 40 INJECTION, SOLUTION INTRAVENOUS at 12:15

## 2018-06-18 RX ADMIN — Medication 10 ML: at 14:49

## 2018-06-18 RX ADMIN — LABETALOL HYDROCHLORIDE 10 MG: 5 INJECTION, SOLUTION INTRAVENOUS at 13:26

## 2018-06-18 RX ADMIN — Medication 10 ML: at 15:10

## 2018-06-18 RX ADMIN — Medication 10 ML: at 13:34

## 2018-06-18 RX ADMIN — BETAMETHASONE SODIUM PHOSPHATE AND BETAMETHASONE ACETATE 12 MG: 3; 3 INJECTION, SUSPENSION INTRA-ARTICULAR; INTRALESIONAL; INTRAMUSCULAR at 15:16

## 2018-06-18 RX ADMIN — LABETALOL HYDROCHLORIDE 10 MG: 5 INJECTION, SOLUTION INTRAVENOUS at 15:10

## 2018-06-18 RX ADMIN — SODIUM CHLORIDE, SODIUM LACTATE, POTASSIUM CHLORIDE, CALCIUM CHLORIDE: 600; 310; 30; 20 INJECTION, SOLUTION INTRAVENOUS at 12:15

## 2018-06-18 ASSESSMENT — PAIN SCALES - GENERAL: PAINLEVEL_OUTOF10: 0

## 2019-03-11 ENCOUNTER — HOSPITAL ENCOUNTER (OUTPATIENT)
Dept: ULTRASOUND IMAGING | Age: 24
Discharge: HOME | End: 2019-03-13
Payer: COMMERCIAL

## 2019-03-11 DIAGNOSIS — O09.899 HISTORY OF PRETERM DELIVERY, CURRENTLY PREGNANT: ICD-10-CM

## 2019-03-11 PROCEDURE — 76817 TRANSVAGINAL US OBSTETRIC: CPT

## 2019-03-11 PROCEDURE — 76801 OB US < 14 WKS SINGLE FETUS: CPT

## 2019-07-01 ENCOUNTER — HOSPITAL ENCOUNTER (OUTPATIENT)
Age: 24
Discharge: HOME | End: 2019-07-01
Attending: OBSTETRICS & GYNECOLOGY | Admitting: OBSTETRICS & GYNECOLOGY
Payer: COMMERCIAL

## 2019-07-01 VITALS — HEART RATE: 80 BPM | TEMPERATURE: 98.1 F | WEIGHT: 109 LBS | HEIGHT: 67 IN | BODY MASS INDEX: 17.11 KG/M2

## 2019-07-01 LAB
AMPHETAMINE SCREEN, URINE: ABNORMAL
BACTERIA: ABNORMAL /HPF
BARBITURATE SCREEN URINE: ABNORMAL
BENZODIAZEPINE SCREEN, URINE: ABNORMAL
BILIRUBIN URINE: NEGATIVE
BLOOD, URINE: ABNORMAL
CANNABINOID SCREEN URINE: POSITIVE
CLARITY: ABNORMAL
COCAINE METABOLITE SCREEN URINE: ABNORMAL
COLOR: YELLOW
EPITHELIAL CELLS, UA: >100 /HPF (ref 0–5)
GLUCOSE URINE: NEGATIVE MG/DL
HYALINE CASTS: ABNORMAL /HPF (ref 0–5)
KETONES, URINE: NEGATIVE MG/DL
LEUKOCYTE ESTERASE, URINE: ABNORMAL
Lab: ABNORMAL
NITRITE, URINE: NEGATIVE
OPIATE SCREEN URINE: ABNORMAL
PH UA: 5.5 (ref 5–9)
PHENCYCLIDINE SCREEN URINE: ABNORMAL
PROTEIN UA: 30 MG/DL
RBC UA: ABNORMAL /HPF (ref 0–2)
SPECIFIC GRAVITY UA: 1.03 (ref 1–1.03)
UROBILINOGEN, URINE: 1 E.U./DL
WBC UA: >100 /HPF (ref 0–5)

## 2019-07-01 PROCEDURE — 80307 DRUG TEST PRSMV CHEM ANLYZR: CPT

## 2019-07-01 PROCEDURE — 81001 URINALYSIS AUTO W/SCOPE: CPT

## 2019-07-01 PROCEDURE — 99283 EMERGENCY DEPT VISIT LOW MDM: CPT

## 2019-07-01 NOTE — FLOWSHEET NOTE
Arrived ambulatory to [facility] Triage with C/O lower abdominal cramping. HX of 3  deliveries last baby 26 weeks C/S for breech. UA obtained and sent to lab. Prenatal record shows last pregnancy 32 weeks.

## 2019-08-06 ENCOUNTER — HOSPITAL ENCOUNTER (OUTPATIENT)
Dept: ULTRASOUND IMAGING | Age: 24
Discharge: HOME | End: 2019-08-08
Payer: COMMERCIAL

## 2019-08-06 DIAGNOSIS — Z34.82 ENCOUNTER FOR SUPERVISION OF OTHER NORMAL PREGNANCY, SECOND TRIMESTER: ICD-10-CM

## 2019-08-06 PROCEDURE — 76805 OB US >/= 14 WKS SNGL FETUS: CPT

## 2019-08-06 PROCEDURE — 76817 TRANSVAGINAL US OBSTETRIC: CPT

## 2019-08-14 ENCOUNTER — HOSPITAL ENCOUNTER (OUTPATIENT)
Age: 24
Discharge: HOME | End: 2019-08-15
Attending: OBSTETRICS & GYNECOLOGY | Admitting: OBSTETRICS & GYNECOLOGY
Payer: COMMERCIAL

## 2019-08-15 VITALS
BODY MASS INDEX: 17.7 KG/M2 | TEMPERATURE: 98.2 F | HEART RATE: 61 BPM | DIASTOLIC BLOOD PRESSURE: 70 MMHG | SYSTOLIC BLOOD PRESSURE: 132 MMHG | WEIGHT: 113 LBS

## 2019-08-15 LAB
BACTERIA: ABNORMAL /HPF
BILIRUBIN URINE: NEGATIVE
BLOOD, URINE: NEGATIVE
CLARITY: ABNORMAL
COLOR: YELLOW
EPITHELIAL CELLS, UA: ABNORMAL /HPF
GLUCOSE URINE: NEGATIVE MG/DL
KETONES, URINE: NEGATIVE MG/DL
LEUKOCYTE ESTERASE, URINE: ABNORMAL
NITRITE, URINE: NEGATIVE
PH UA: 6 (ref 5–9)
PROTEIN UA: ABNORMAL MG/DL
RBC UA: ABNORMAL /HPF (ref 0–2)
SPECIFIC GRAVITY UA: 1.02 (ref 1–1.03)
UROBILINOGEN, URINE: 1 E.U./DL
WBC UA: ABNORMAL /HPF (ref 0–5)

## 2019-08-15 PROCEDURE — 99285 EMERGENCY DEPT VISIT HI MDM: CPT

## 2019-08-15 PROCEDURE — 99220 PR INITIAL OBSERVATION CARE/DAY 70 MINUTES: CPT | Performed by: OBSTETRICS & GYNECOLOGY

## 2019-08-15 PROCEDURE — 81001 URINALYSIS AUTO W/SCOPE: CPT

## 2019-08-15 NOTE — FLOWSHEET NOTE
Pt presents to triage with c/o ctx and back pain that started last night at 0200 and has been getting worse. Denies loss of fluid or vaginal bleeding. +FM. States has a history of a 26 week delivery one year ago. C/O diarrhea for the past day.

## 2019-08-16 ENCOUNTER — APPOINTMENT (OUTPATIENT)
Dept: ULTRASOUND IMAGING | Age: 24
End: 2019-08-16
Payer: COMMERCIAL

## 2019-08-16 ENCOUNTER — HOSPITAL ENCOUNTER (OUTPATIENT)
Age: 24
Discharge: HOME | End: 2019-08-16
Attending: OBSTETRICS & GYNECOLOGY | Admitting: OBSTETRICS & GYNECOLOGY
Payer: COMMERCIAL

## 2019-08-16 VITALS
BODY MASS INDEX: 17.74 KG/M2 | RESPIRATION RATE: 20 BRPM | DIASTOLIC BLOOD PRESSURE: 68 MMHG | WEIGHT: 113 LBS | SYSTOLIC BLOOD PRESSURE: 120 MMHG | TEMPERATURE: 98.8 F | HEART RATE: 80 BPM | HEIGHT: 67 IN

## 2019-08-16 LAB
AMPHETAMINE SCREEN, URINE: NORMAL
BACTERIA: ABNORMAL /HPF
BARBITURATE SCREEN URINE: NORMAL
BENZODIAZEPINE SCREEN, URINE: NORMAL
BILIRUBIN URINE: NEGATIVE
BLOOD, URINE: ABNORMAL
CANNABINOID SCREEN URINE: NORMAL
CLARITY: ABNORMAL
COCAINE METABOLITE SCREEN URINE: NORMAL
COLOR: YELLOW
EPITHELIAL CELLS, UA: ABNORMAL /HPF (ref 0–5)
FETAL FIBRONECTIN: POSITIVE
GLUCOSE URINE: 100 MG/DL
HYALINE CASTS: ABNORMAL /HPF (ref 0–5)
KETONES, URINE: NEGATIVE MG/DL
LEUKOCYTE ESTERASE, URINE: ABNORMAL
Lab: NORMAL
NITRITE, URINE: NEGATIVE
OPIATE SCREEN URINE: NORMAL
PH UA: 6.5 (ref 5–9)
PHENCYCLIDINE SCREEN URINE: NORMAL
PROTEIN UA: 30 MG/DL
RBC UA: >100 /HPF (ref 0–5)
SPECIFIC GRAVITY UA: 1.03 (ref 1–1.03)
UROBILINOGEN, URINE: 1 E.U./DL
WBC UA: ABNORMAL /HPF (ref 0–5)

## 2019-08-16 PROCEDURE — 82731 ASSAY OF FETAL FIBRONECTIN: CPT

## 2019-08-16 PROCEDURE — 96360 HYDRATION IV INFUSION INIT: CPT

## 2019-08-16 PROCEDURE — 96372 THER/PROPH/DIAG INJ SC/IM: CPT

## 2019-08-16 PROCEDURE — 80307 DRUG TEST PRSMV CHEM ANLYZR: CPT

## 2019-08-16 PROCEDURE — 99283 EMERGENCY DEPT VISIT LOW MDM: CPT | Performed by: OBSTETRICS & GYNECOLOGY

## 2019-08-16 PROCEDURE — 6370000000 HC RX 637 (ALT 250 FOR IP): Performed by: OBSTETRICS & GYNECOLOGY

## 2019-08-16 PROCEDURE — 76817 TRANSVAGINAL US OBSTETRIC: CPT

## 2019-08-16 PROCEDURE — 2580000003 HC RX 258: Performed by: OBSTETRICS & GYNECOLOGY

## 2019-08-16 PROCEDURE — 99285 EMERGENCY DEPT VISIT HI MDM: CPT

## 2019-08-16 PROCEDURE — 6360000002 HC RX W HCPCS

## 2019-08-16 PROCEDURE — 96361 HYDRATE IV INFUSION ADD-ON: CPT

## 2019-08-16 PROCEDURE — 81001 URINALYSIS AUTO W/SCOPE: CPT

## 2019-08-16 PROCEDURE — 59025 FETAL NON-STRESS TEST: CPT | Performed by: OBSTETRICS & GYNECOLOGY

## 2019-08-16 RX ORDER — NIFEDIPINE 10 MG/1
20 CAPSULE ORAL ONCE
Status: COMPLETED | OUTPATIENT
Start: 2019-08-16 | End: 2019-08-16

## 2019-08-16 RX ORDER — 0.9 % SODIUM CHLORIDE 0.9 %
1000 INTRAVENOUS SOLUTION INTRAVENOUS ONCE
Status: COMPLETED | OUTPATIENT
Start: 2019-08-16 | End: 2019-08-16

## 2019-08-16 RX ORDER — TERBUTALINE SULFATE 1 MG/ML
0.25 INJECTION, SOLUTION SUBCUTANEOUS ONCE
Status: COMPLETED | OUTPATIENT
Start: 2019-08-16 | End: 2019-08-16

## 2019-08-16 RX ORDER — TERBUTALINE SULFATE 1 MG/ML
INJECTION, SOLUTION SUBCUTANEOUS
Status: COMPLETED
Start: 2019-08-16 | End: 2019-08-16

## 2019-08-16 RX ORDER — SODIUM CHLORIDE 9 MG/ML
INJECTION, SOLUTION INTRAVENOUS CONTINUOUS
Status: DISCONTINUED | OUTPATIENT
Start: 2019-08-16 | End: 2019-08-17 | Stop reason: HOSPADM

## 2019-08-16 RX ADMIN — SODIUM CHLORIDE 200 ML/HR: 9 INJECTION, SOLUTION INTRAVENOUS at 23:25

## 2019-08-16 RX ADMIN — TERBUTALINE SULFATE 0.25 MG: 1 INJECTION SUBCUTANEOUS at 23:16

## 2019-08-16 RX ADMIN — TERBUTALINE SULFATE 0.25 MG: 1 INJECTION, SOLUTION SUBCUTANEOUS at 23:16

## 2019-08-16 RX ADMIN — NIFEDIPINE 20 MG: 10 CAPSULE ORAL at 23:23

## 2019-08-16 RX ADMIN — SODIUM CHLORIDE 1000 ML: 9 INJECTION, SOLUTION INTRAVENOUS at 21:27

## 2019-08-16 RX ADMIN — SODIUM CHLORIDE 1000 ML: 9 INJECTION, SOLUTION INTRAVENOUS at 22:14

## 2019-08-17 NOTE — PROGRESS NOTES
Patient came to [facility] complaining of abdominal contractions and tightness. Was discharged from [facility] L&D today at 80 and was told to return to hospital if contractions started again. Rating contractions 7/10. Denies vaginal bleeding or intercourse in last 24 hours. Admits to fetal movement.

## 2019-08-17 NOTE — PROGRESS NOTES
Dr. Dash Vargas speaking with Dr. Kelvin Walker at [facility] for acceptance of transfer. Will update patient and notify transport.

## 2019-08-17 NOTE — ED TRIAGE NOTES
Labor and Delivery Triage transfer note        CHIEF COMPLAINT:  abdominal pain, contractions    HISTORY OF PRESENT ILLNESS:      The patient is a 27 y.o. female  33w11d. OB History        4    Para   3    Term   0       3    AB   0    Living   3       SAB   0    TAB   0    Ectopic   0    Molar        Multiple   0    Live Births   3          Obstetric Comments   Patient states she was induced at 34 weeks due to [facility]  Second child  labor, Claudene Bon was coming out in her sack\"           Patient presents complaining of  abdominal pain, contractions  She reports Normal fetal movement. She denies vaginal bleeding. She denies leakage of amniotic fluid     Estimated Due Date:  Estimated Date of Delivery: 11/10/19    PAST MEDICAL HISTORY:   Past Medical History:   Diagnosis Date    Gestational hypertension, third trimester      uterine contractions in third trimester, antepartum 10/5/2016       PAST  SURGICAL HISTORY:   Past Surgical History:   Procedure Laterality Date     SECTION         SOCIAL HISTORY:     reports that she has quit smoking. Her smoking use included cigarettes and cigars. She has never used smokeless tobacco. She reports that she does not drink alcohol or use drugs. MEDICATIONS:    Prior to Admission medications    Not on File        FAMILY HISTORY:   Family History   Problem Relation Age of Onset    Diabetes Father        DRUG ALLERGIES: Patient has no known allergies. PRENATAL CARE:   Complicated by: previous  delivery     REVIEW OF SYSTEMS:     Pertinent items are noted in HPI. PHYSICAL EXAM:    Vital Signs: Blood pressure 135/82, pulse 57, temperature 98.8 °F (37.1 °C), temperature source Oral, resp. rate 20, height 5' 7\" (1.702 m), weight 113 lb (51.3 kg), unknown if currently breastfeeding.     CONSTITUTIONAL:  awake, alert, cooperative, no apparent distress, and appears stated age  LUNGS:  No increased work of breathing, good air 2019 MODERATE* Negative Final    Fetal Fibronectin 2019 POSITIVE   Final    Comment: The Rapid fFN result should not be interpreted as absolute  evidence for the presence or absence of a process that will  result in:  1. a delivery in less than or equal to 7-14 days from the     specimen collection in symptomatic women. 2. a delivery in less than or equal to 34 weeks, 6 days in     asymptomatic women evaluated between 22 weeks, 0 days and     30 weeks, 6 days of gestation. A POSITIVE fFN result may be observed for patients who have  experienced cervical disruption caused by, but not limited  to events such as sexual intercourse, digital cervical  examination, or vaginal probe ultrasound. The Rapid fFN result should always be used in conjunction with  information available from the clinical evaluation of the  patient and other diagnostic procedures such as cervical  examination, cervical microbiological culture, assessment of  the uterine activity and the evaluation of other risk factors. Among symptomatic women, between 24 weeks, 0 days and 34 weeks,  6 days;  po                           sitive results indicate increased risk of delivery  in <=7 or <=14 days from sample collection. Among asymptomatic women, between 22 weeks, 0 days and 30 weeks,  6 days; positive results indicate increased risk of delivery in  <=34 weeks 6 days gestation.  Bacteria, UA 2019 RARE* /HPF Final    Hyaline Casts, UA 2019 10-20  0 - 5 /HPF Final    WBC, UA 2019 * 0 - 5 /HPF Final    RBC, UA 2019 >100* 0 - 5 /HPF Final    Epi Cells 2019 10-20  0 - 5 /HPF Final         ASSESSMENT:    The patient is a 27 y.o. female  33w11d with :      LABOR   S/P BMZ X 2 at [facility] with magnesium sulphate tocolysis. + FFN   CL 2.87 cm   Breech   Previous  section     There are no active hospital problems to display for this patient.         Orders Placed This

## 2020-04-22 ENCOUNTER — APPOINTMENT (OUTPATIENT)
Dept: CT IMAGING | Age: 25
End: 2020-04-22
Payer: COMMERCIAL

## 2020-04-22 ENCOUNTER — HOSPITAL ENCOUNTER (EMERGENCY)
Age: 25
Discharge: HOME | End: 2020-04-22
Attending: EMERGENCY MEDICINE
Payer: COMMERCIAL

## 2020-04-22 VITALS
WEIGHT: 95 LBS | HEART RATE: 65 BPM | RESPIRATION RATE: 18 BRPM | TEMPERATURE: 99.1 F | SYSTOLIC BLOOD PRESSURE: 118 MMHG | HEIGHT: 67 IN | DIASTOLIC BLOOD PRESSURE: 80 MMHG | OXYGEN SATURATION: 99 % | BODY MASS INDEX: 14.91 KG/M2

## 2020-04-22 LAB
ALBUMIN SERPL-MCNC: 4.3 G/DL (ref 3.5–4.6)
ALP BLD-CCNC: 84 U/L (ref 40–130)
ALT SERPL-CCNC: 26 U/L (ref 0–33)
AMPHETAMINE SCREEN, URINE: ABNORMAL
ANION GAP SERPL CALCULATED.3IONS-SCNC: 13 MEQ/L (ref 9–15)
AST SERPL-CCNC: 27 U/L (ref 0–35)
BACTERIA: ABNORMAL /HPF
BARBITURATE SCREEN URINE: ABNORMAL
BASOPHILS ABSOLUTE: 0 K/UL (ref 0–0.2)
BASOPHILS RELATIVE PERCENT: 0.3 %
BENZODIAZEPINE SCREEN, URINE: ABNORMAL
BILIRUB SERPL-MCNC: 0.4 MG/DL (ref 0.2–0.7)
BILIRUBIN URINE: NEGATIVE
BLOOD, URINE: NEGATIVE
BUN BLDV-MCNC: 11 MG/DL (ref 6–20)
CALCIUM SERPL-MCNC: 9.3 MG/DL (ref 8.5–9.9)
CANNABINOID SCREEN URINE: ABNORMAL
CHLORIDE BLD-SCNC: 103 MEQ/L (ref 95–107)
CHP ED QC CHECK: NORMAL
CLARITY: CLEAR
CO2: 24 MEQ/L (ref 20–31)
COCAINE METABOLITE SCREEN URINE: ABNORMAL
COLOR: YELLOW
CREAT SERPL-MCNC: 0.53 MG/DL (ref 0.5–0.9)
EOSINOPHILS ABSOLUTE: 0.1 K/UL (ref 0–0.7)
EOSINOPHILS RELATIVE PERCENT: 0.8 %
EPITHELIAL CELLS, UA: ABNORMAL /HPF (ref 0–5)
GFR AFRICAN AMERICAN: >60
GFR NON-AFRICAN AMERICAN: >60
GLOBULIN: 3.4 G/DL (ref 2.3–3.5)
GLUCOSE BLD-MCNC: 96 MG/DL (ref 70–99)
GLUCOSE URINE: NEGATIVE MG/DL
HCT VFR BLD CALC: 32.4 % (ref 37–47)
HEMOGLOBIN: 9.9 G/DL (ref 12–16)
HYALINE CASTS: ABNORMAL /HPF (ref 0–5)
INFLUENZA A BY PCR: NEGATIVE
INFLUENZA B BY PCR: NEGATIVE
KETONES, URINE: NEGATIVE MG/DL
LEUKOCYTE ESTERASE, URINE: ABNORMAL
LYMPHOCYTES ABSOLUTE: 2 K/UL (ref 1–4.8)
LYMPHOCYTES RELATIVE PERCENT: 16.8 %
Lab: ABNORMAL
MCH RBC QN AUTO: 21.6 PG (ref 27–31.3)
MCHC RBC AUTO-ENTMCNC: 30.6 % (ref 33–37)
MCV RBC AUTO: 70.5 FL (ref 82–100)
METHADONE SCREEN, URINE: ABNORMAL
MONOCYTES ABSOLUTE: 1 K/UL (ref 0.2–0.8)
MONOCYTES RELATIVE PERCENT: 8.1 %
NEUTROPHILS ABSOLUTE: 8.7 K/UL (ref 1.4–6.5)
NEUTROPHILS RELATIVE PERCENT: 74 %
NITRITE, URINE: POSITIVE
OPIATE SCREEN URINE: ABNORMAL
OXYCODONE URINE: POSITIVE
PDW BLD-RTO: 19 % (ref 11.5–14.5)
PH UA: 7.5 (ref 5–9)
PHENCYCLIDINE SCREEN URINE: ABNORMAL
PLATELET # BLD: 507 K/UL (ref 130–400)
POTASSIUM SERPL-SCNC: 4 MEQ/L (ref 3.4–4.9)
PREGNANCY TEST URINE, POC: NEGATIVE
PROPOXYPHENE SCREEN: ABNORMAL
PROTEIN UA: NEGATIVE MG/DL
RBC # BLD: 4.59 M/UL (ref 4.2–5.4)
RBC UA: ABNORMAL /HPF (ref 0–5)
SODIUM BLD-SCNC: 140 MEQ/L (ref 135–144)
SPECIFIC GRAVITY UA: 1.02 (ref 1–1.03)
TOTAL PROTEIN: 7.7 G/DL (ref 6.3–8)
URINE REFLEX TO CULTURE: YES
UROBILINOGEN, URINE: 0.2 E.U./DL
WBC # BLD: 11.8 K/UL (ref 4.8–10.8)
WBC UA: ABNORMAL /HPF (ref 0–5)

## 2020-04-22 PROCEDURE — 85025 COMPLETE CBC W/AUTO DIFF WBC: CPT

## 2020-04-22 PROCEDURE — 81001 URINALYSIS AUTO W/SCOPE: CPT

## 2020-04-22 PROCEDURE — 87502 INFLUENZA DNA AMP PROBE: CPT

## 2020-04-22 PROCEDURE — 6360000002 HC RX W HCPCS: Performed by: EMERGENCY MEDICINE

## 2020-04-22 PROCEDURE — 74176 CT ABD & PELVIS W/O CONTRAST: CPT

## 2020-04-22 PROCEDURE — 80307 DRUG TEST PRSMV CHEM ANLYZR: CPT

## 2020-04-22 PROCEDURE — 96374 THER/PROPH/DIAG INJ IV PUSH: CPT

## 2020-04-22 PROCEDURE — 87086 URINE CULTURE/COLONY COUNT: CPT

## 2020-04-22 PROCEDURE — 99284 EMERGENCY DEPT VISIT MOD MDM: CPT

## 2020-04-22 PROCEDURE — 80053 COMPREHEN METABOLIC PANEL: CPT

## 2020-04-22 PROCEDURE — 2580000003 HC RX 258: Performed by: EMERGENCY MEDICINE

## 2020-04-22 PROCEDURE — 87186 SC STD MICRODIL/AGAR DIL: CPT

## 2020-04-22 PROCEDURE — 36415 COLL VENOUS BLD VENIPUNCTURE: CPT

## 2020-04-22 PROCEDURE — 87077 CULTURE AEROBIC IDENTIFY: CPT

## 2020-04-22 RX ORDER — ONDANSETRON 2 MG/ML
4 INJECTION INTRAMUSCULAR; INTRAVENOUS ONCE
Status: COMPLETED | OUTPATIENT
Start: 2020-04-22 | End: 2020-04-22

## 2020-04-22 RX ORDER — ONDANSETRON 4 MG/1
4 TABLET, FILM COATED ORAL EVERY 8 HOURS PRN
Qty: 10 TABLET | Refills: 0 | Status: ON HOLD | OUTPATIENT
Start: 2020-04-22 | End: 2020-09-28 | Stop reason: HOSPADM

## 2020-04-22 RX ORDER — SULFAMETHOXAZOLE AND TRIMETHOPRIM 800; 160 MG/1; MG/1
1 TABLET ORAL 2 TIMES DAILY
Qty: 14 TABLET | Refills: 0 | Status: SHIPPED | OUTPATIENT
Start: 2020-04-22 | End: 2020-04-29

## 2020-04-22 RX ORDER — 0.9 % SODIUM CHLORIDE 0.9 %
500 INTRAVENOUS SOLUTION INTRAVENOUS ONCE
Status: COMPLETED | OUTPATIENT
Start: 2020-04-22 | End: 2020-04-22

## 2020-04-22 RX ADMIN — ONDANSETRON 4 MG: 2 INJECTION INTRAMUSCULAR; INTRAVENOUS at 12:53

## 2020-04-22 RX ADMIN — SODIUM CHLORIDE 500 ML: 9 INJECTION, SOLUTION INTRAVENOUS at 12:53

## 2020-04-22 ASSESSMENT — ENCOUNTER SYMPTOMS
EYE DISCHARGE: 0
NAUSEA: 0
SORE THROAT: 0
STRIDOR: 0
ANAL BLEEDING: 0
BLOOD IN STOOL: 0
ABDOMINAL PAIN: 1
COUGH: 0
CHEST TIGHTNESS: 0
TROUBLE SWALLOWING: 0
CHOKING: 0
DIARRHEA: 0
SINUS PRESSURE: 0
VOMITING: 0
SHORTNESS OF BREATH: 0
RHINORRHEA: 0
EYE PAIN: 0
EYE ITCHING: 0
VOICE CHANGE: 0
ABDOMINAL DISTENTION: 0
PHOTOPHOBIA: 0
FACIAL SWELLING: 0
WHEEZING: 0
BACK PAIN: 0
COLOR CHANGE: 0
CONSTIPATION: 0
EYE REDNESS: 0

## 2020-04-22 ASSESSMENT — PAIN SCALES - GENERAL: PAINLEVEL_OUTOF10: 8

## 2020-04-22 ASSESSMENT — PAIN DESCRIPTION - DESCRIPTORS: DESCRIPTORS: ACHING

## 2020-04-22 ASSESSMENT — PAIN DESCRIPTION - FREQUENCY: FREQUENCY: CONTINUOUS

## 2020-04-22 ASSESSMENT — PAIN DESCRIPTION - LOCATION: LOCATION: GENERALIZED

## 2020-04-22 ASSESSMENT — PAIN DESCRIPTION - PAIN TYPE: TYPE: ACUTE PAIN

## 2020-04-22 NOTE — ED PROVIDER NOTES
[facility] [facility] ED  eMERGENCY dEPARTMENT eNCOUnter      Pt Name: Braden Moreno  MRN: [id number]  [place] 1995  Date of evaluation: 4/22/2020  Provider: Jim Ring MD    CHIEF COMPLAINT       Chief Complaint   Patient presents with    Abdominal Pain     since last nite         HISTORY OF PRESENT ILLNESS   (Location/Symptom, Timing/Onset, Context/Setting, Quality, Duration, Modifying Factors, Severity)  Note limiting factors. Braden Moreno is a 27 y.o. female who presents to the emergency department with lower abdominal pain which started suddenly last night. She states in her right lower quadrant and kept her awake most of the night. Context in setting this started at home. Quality pain is sharp. Duration: 12 to 14 hours. Modifying factors: Nothing makes the pain better or worse. She denies increased pain with walking. She has had no prior treatment for this. States she is had a fever at home with a headache but denies stiff neck nausea vomiting diarrhea or cough. Verity moderate to severe    HPI    Nursing Notes were reviewed. REVIEW OF SYSTEMS    (2-9 systems for level 4, 10 or more for level 5)     Review of Systems   Constitutional: Negative for appetite change, chills, diaphoresis, fatigue and fever. HENT: Negative for congestion, dental problem, ear discharge, ear pain, facial swelling, hearing loss, mouth sores, nosebleeds, postnasal drip, rhinorrhea, sinus pressure, sneezing, sore throat, tinnitus, trouble swallowing and voice change. Eyes: Negative for photophobia, pain, discharge, redness, itching and visual disturbance. Respiratory: Negative for cough, choking, chest tightness, shortness of breath, wheezing and stridor. Cardiovascular: Negative for chest pain, palpitations and leg swelling. Gastrointestinal: Positive for abdominal pain. Negative for abdominal distention, anal bleeding, blood in stool, constipation, diarrhea, nausea and vomiting. and the CAT scan to the patient and discharged her with instructions for urinary tract infection appendicitis was considered but ruled out by CT    MDM      CRITICAL CARE TIME     CONSULTS:  None    PROCEDURES:  Unless otherwise noted below, none     Procedures    FINAL IMPRESSION      1. Urinary tract infection without hematuria, site unspecified          DISPOSITION/PLAN   DISPOSITION Decision To Discharge 04/22/2020 03:25:10 PM      PATIENT REFERRED TO:  Meryl Self MD  [address] [place] [address]  [phone]    Go in 1 week  For follow up. Return if worse in any way.       DISCHARGE MEDICATIONS:  New Prescriptions    ONDANSETRON (ZOFRAN) 4 MG TABLET    Take 1 tablet by mouth every 8 hours as needed for Nausea    SULFAMETHOXAZOLE-TRIMETHOPRIM (BACTRIM DS) 800-160 MG PER TABLET    Take 1 tablet by mouth 2 times daily for 7 days          (Please note that portions of this note were completed with a voice recognition program.  Efforts were made to edit the dictations but occasionally words are mis-transcribed.)    Boyd Degroot MD (electronically signed)  Attending Emergency Physician          Boyd Degroot MD  04/22/20 1622

## 2020-04-24 LAB
ORGANISM: ABNORMAL
URINE CULTURE, ROUTINE: ABNORMAL

## 2020-05-03 ENCOUNTER — HOSPITAL ENCOUNTER (EMERGENCY)
Age: 25
Discharge: HOME | End: 2020-05-03
Payer: COMMERCIAL

## 2020-05-03 VITALS
DIASTOLIC BLOOD PRESSURE: 67 MMHG | HEIGHT: 67 IN | HEART RATE: 95 BPM | WEIGHT: 95 LBS | BODY MASS INDEX: 14.91 KG/M2 | TEMPERATURE: 98.4 F | OXYGEN SATURATION: 100 % | SYSTOLIC BLOOD PRESSURE: 110 MMHG | RESPIRATION RATE: 16 BRPM

## 2020-05-03 PROCEDURE — 6360000002 HC RX W HCPCS: Performed by: PHYSICIAN ASSISTANT

## 2020-05-03 PROCEDURE — 99282 EMERGENCY DEPT VISIT SF MDM: CPT

## 2020-05-03 PROCEDURE — 6370000000 HC RX 637 (ALT 250 FOR IP): Performed by: PHYSICIAN ASSISTANT

## 2020-05-03 PROCEDURE — 96374 THER/PROPH/DIAG INJ IV PUSH: CPT

## 2020-05-03 RX ORDER — PREDNISONE 20 MG/1
40 TABLET ORAL DAILY
Qty: 8 TABLET | Refills: 0 | Status: SHIPPED | OUTPATIENT
Start: 2020-05-04 | End: 2020-05-08

## 2020-05-03 RX ORDER — FAMOTIDINE 20 MG/1
20 TABLET, FILM COATED ORAL ONCE
Status: COMPLETED | OUTPATIENT
Start: 2020-05-03 | End: 2020-05-03

## 2020-05-03 RX ORDER — METHYLPREDNISOLONE SODIUM SUCCINATE 125 MG/2ML
125 INJECTION, POWDER, LYOPHILIZED, FOR SOLUTION INTRAMUSCULAR; INTRAVENOUS ONCE
Status: COMPLETED | OUTPATIENT
Start: 2020-05-03 | End: 2020-05-03

## 2020-05-03 RX ORDER — DIPHENHYDRAMINE HCL 25 MG
25 CAPSULE ORAL EVERY 6 HOURS PRN
Qty: 20 CAPSULE | Refills: 0 | Status: SHIPPED | OUTPATIENT
Start: 2020-05-03 | End: 2020-05-08

## 2020-05-03 RX ORDER — FAMOTIDINE 20 MG/1
20 TABLET, FILM COATED ORAL DAILY
Qty: 5 TABLET | Refills: 0 | Status: ON HOLD | OUTPATIENT
Start: 2020-05-03 | End: 2020-09-28 | Stop reason: HOSPADM

## 2020-05-03 RX ADMIN — FAMOTIDINE 20 MG: 20 TABLET, FILM COATED ORAL at 17:50

## 2020-05-03 RX ADMIN — METHYLPREDNISOLONE SODIUM SUCCINATE 125 MG: 125 INJECTION, POWDER, FOR SOLUTION INTRAMUSCULAR; INTRAVENOUS at 17:50

## 2020-05-03 ASSESSMENT — ENCOUNTER SYMPTOMS
GASTROINTESTINAL NEGATIVE: 1
RESPIRATORY NEGATIVE: 1
EYES NEGATIVE: 1

## 2020-05-03 ASSESSMENT — PAIN SCALES - GENERAL: PAINLEVEL_OUTOF10: 7

## 2020-05-03 ASSESSMENT — PAIN DESCRIPTION - LOCATION: LOCATION: GENERALIZED

## 2020-05-03 ASSESSMENT — PAIN DESCRIPTION - DESCRIPTORS: DESCRIPTORS: BURNING

## 2020-05-03 NOTE — ED PROVIDER NOTES
otherwise noted below, none     Procedures      FINAL IMPRESSION      1.  Allergic reaction, initial encounter          DISPOSITION/PLAN   DISPOSITION Decision To Discharge 05/03/2020 06:48:32 PM          Kaitlyn Whitman PA-C (electronically signed)  Attending Emergency Physician  [address] [place] [address] Luz Marina Max PA-C  05/03/20 5645

## 2020-05-18 ENCOUNTER — HOSPITAL ENCOUNTER (EMERGENCY)
Age: 25
Discharge: HOME | End: 2020-05-18
Attending: EMERGENCY MEDICINE
Payer: COMMERCIAL

## 2020-05-18 VITALS
HEIGHT: 67 IN | BODY MASS INDEX: 13.34 KG/M2 | WEIGHT: 85 LBS | TEMPERATURE: 100.4 F | SYSTOLIC BLOOD PRESSURE: 120 MMHG | HEART RATE: 92 BPM | OXYGEN SATURATION: 98 % | DIASTOLIC BLOOD PRESSURE: 68 MMHG | RESPIRATION RATE: 18 BRPM

## 2020-05-18 PROCEDURE — 6360000002 HC RX W HCPCS: Performed by: EMERGENCY MEDICINE

## 2020-05-18 PROCEDURE — 6370000000 HC RX 637 (ALT 250 FOR IP): Performed by: EMERGENCY MEDICINE

## 2020-05-18 PROCEDURE — 96372 THER/PROPH/DIAG INJ SC/IM: CPT

## 2020-05-18 PROCEDURE — 99283 EMERGENCY DEPT VISIT LOW MDM: CPT

## 2020-05-18 RX ORDER — ACETAMINOPHEN 325 MG/1
650 TABLET ORAL ONCE
Status: COMPLETED | OUTPATIENT
Start: 2020-05-18 | End: 2020-05-18

## 2020-05-18 RX ORDER — KETOROLAC TROMETHAMINE 30 MG/ML
30 INJECTION, SOLUTION INTRAMUSCULAR; INTRAVENOUS ONCE
Status: COMPLETED | OUTPATIENT
Start: 2020-05-18 | End: 2020-05-18

## 2020-05-18 RX ADMIN — ACETAMINOPHEN 650 MG: 325 TABLET ORAL at 05:26

## 2020-05-18 RX ADMIN — KETOROLAC TROMETHAMINE 30 MG: 30 INJECTION, SOLUTION INTRAMUSCULAR at 05:26

## 2020-05-18 ASSESSMENT — PAIN SCALES - GENERAL
PAINLEVEL_OUTOF10: 8
PAINLEVEL_OUTOF10: 5
PAINLEVEL_OUTOF10: 8

## 2020-05-18 ASSESSMENT — PAIN DESCRIPTION - ONSET: ONSET: PROGRESSIVE

## 2020-05-18 ASSESSMENT — PAIN DESCRIPTION - LOCATION: LOCATION: GENERALIZED

## 2020-05-18 ASSESSMENT — PAIN DESCRIPTION - FREQUENCY: FREQUENCY: CONTINUOUS

## 2020-05-18 ASSESSMENT — PAIN DESCRIPTION - PAIN TYPE
TYPE: ACUTE PAIN
TYPE: ACUTE PAIN

## 2020-05-18 ASSESSMENT — PAIN DESCRIPTION - DESCRIPTORS: DESCRIPTORS: ACHING

## 2020-05-19 ENCOUNTER — CARE COORDINATION (OUTPATIENT)
Dept: CARE COORDINATION | Age: 25
End: 2020-05-19

## 2020-05-20 ENCOUNTER — CARE COORDINATION (OUTPATIENT)
Dept: CARE COORDINATION | Age: 25
End: 2020-05-20

## 2020-06-11 ENCOUNTER — HOSPITAL ENCOUNTER (OUTPATIENT)
Dept: ULTRASOUND IMAGING | Age: 25
Discharge: HOME | End: 2020-06-13
Payer: COMMERCIAL

## 2020-06-11 PROCEDURE — 76817 TRANSVAGINAL US OBSTETRIC: CPT

## 2020-06-11 PROCEDURE — 76801 OB US < 14 WKS SINGLE FETUS: CPT

## 2020-06-17 ENCOUNTER — HOSPITAL ENCOUNTER (EMERGENCY)
Age: 25
Discharge: HOME | End: 2020-06-17
Payer: COMMERCIAL

## 2020-06-17 VITALS
DIASTOLIC BLOOD PRESSURE: 67 MMHG | BODY MASS INDEX: 16.79 KG/M2 | HEART RATE: 100 BPM | SYSTOLIC BLOOD PRESSURE: 117 MMHG | WEIGHT: 107 LBS | OXYGEN SATURATION: 100 % | RESPIRATION RATE: 20 BRPM | TEMPERATURE: 98.4 F | HEIGHT: 67 IN

## 2020-06-17 PROCEDURE — 99281 EMR DPT VST MAYX REQ PHY/QHP: CPT

## 2020-06-17 ASSESSMENT — ENCOUNTER SYMPTOMS
VOMITING: 0
WHEEZING: 0
ABDOMINAL PAIN: 0
PHOTOPHOBIA: 0
SHORTNESS OF BREATH: 0
COUGH: 0
NAUSEA: 0

## 2020-06-17 NOTE — ED PROVIDER NOTES
[address] [facility] ED  EMERGENCY DEPARTMENT ENCOUNTER      Pt Name: Imani Barbosa  MRN: [id number]  [place] 1995  Date of evaluation: 6/17/2020  Provider: Marek Lyman PA-C    CHIEF COMPLAINT       Chief Complaint   Patient presents with    Letter for School/Work     needs work excuse due to having emesis the past couple of days and found out she was pregnant. HISTORY OF PRESENT ILLNESS   (Location/Symptom, Timing/Onset, Context/Setting, Quality, Duration, Modifying Factors, Severity)  Note limiting factors. Imani Barbosa is a 27 y.o. female who per chart reviews no past medical history presents to the emergency department to obtain a work excuse note. Patient states that on Sunday she vomited a few times and found out she was pregnant so she called off of work. Her work is requesting that she bring in a note to return to work tonight. She states she is completely asymptomatic currently. She denies fever chills cough nausea vomiting diarrhea abdominal pain pelvic pain vaginal symptoms urinary symptoms. HPI    Nursing Notes were reviewed. REVIEW OF SYSTEMS    (2-9 systems for level 4, 10 or more for level 5)     Review of Systems   Constitutional: Negative for chills and fever. HENT: Negative for congestion. Eyes: Negative for photophobia. Respiratory: Negative for cough, shortness of breath and wheezing. Cardiovascular: Negative for chest pain and palpitations. Gastrointestinal: Negative for abdominal pain, nausea and vomiting. Genitourinary: Negative for dysuria, frequency and hematuria. Musculoskeletal: Negative for myalgias. Allergic/Immunologic: Negative for immunocompromised state. Neurological: Negative for dizziness, weakness and headaches. All other systems reviewed and are negative. Except as noted above the remainder of the review of systems was reviewed and negative.        PAST MEDICAL HISTORY     Past Medical History:   Diagnosis Date    Gestational hypertension, third trimester      uterine contractions in third trimester, antepartum 10/5/2016         SURGICAL HISTORY       Past Surgical History:   Procedure Laterality Date     SECTION           CURRENT MEDICATIONS       Discharge Medication List as of 2020  1:35 PM      CONTINUE these medications which have NOT CHANGED    Details   famotidine (PEPCID) 20 MG tablet Take 1 tablet by mouth daily, Disp-5 tablet, R-0Normal      ondansetron (ZOFRAN) 4 MG tablet Take 1 tablet by mouth every 8 hours as needed for Nausea, Disp-10 tablet, R-0Print             ALLERGIES     Patient has no known allergies.     FAMILY HISTORY       Family History   Problem Relation Age of Onset    Diabetes Father           SOCIAL HISTORY       Social History     Socioeconomic History    Marital status: Single     Spouse name: None    Number of children: None    Years of education: None    Highest education level: None   Occupational History    None   Social Needs    Financial resource strain: None    Food insecurity     Worry: None     Inability: None    Transportation needs     Medical: None     Non-medical: None   Tobacco Use    Smoking status: Current Some Day Smoker     Types: Cigars    Smokeless tobacco: Never Used    Tobacco comment: black n milds   Substance and Sexual Activity    Alcohol use: Yes     Comment: socially    Drug use: No    Sexual activity: Yes     Partners: Male   Lifestyle    Physical activity     Days per week: None     Minutes per session: None    Stress: None   Relationships    Social connections     Talks on phone: None     Gets together: None     Attends Confucianist service: None     Active member of club or organization: None     Attends meetings of clubs or organizations: None     Relationship status: None    Intimate partner violence     Fear of current or ex partner: None     Emotionally abused: None     Physically abused: None     Forced sexual activity: None   Other Topics Concern    None   Social History Narrative    None       SCREENINGS                PHYSICAL EXAM    (up to 7 for level 4, 8 or more for level 5)     ED Triage Vitals [06/17/20 1326]   BP Temp Temp Source Pulse Resp SpO2 Height Weight   117/67 98.4 °F (36.9 °C) Temporal 100 20 100 % 5' 7\" (1.702 m) 107 lb (48.5 kg)       Physical Exam  Constitutional:       General: She is not in acute distress. Appearance: She is well-developed. She is not toxic-appearing. HENT:      Head: Normocephalic and atraumatic. Nose: Nose normal.      Mouth/Throat:      Mouth: Mucous membranes are moist.   Eyes:      Pupils: Pupils are equal, round, and reactive to light. Neck:      Musculoskeletal: Normal range of motion. Cardiovascular:      Rate and Rhythm: Normal rate and regular rhythm. Heart sounds: No murmur. No friction rub. No gallop. Pulmonary:      Effort: Pulmonary effort is normal.      Breath sounds: Normal breath sounds. No wheezing, rhonchi or rales. Abdominal:      General: Bowel sounds are normal. There is no distension. Palpations: Abdomen is soft. Tenderness: There is no abdominal tenderness. There is no right CVA tenderness, left CVA tenderness, guarding or rebound. Musculoskeletal:         General: No swelling. Skin:     General: Skin is warm and dry. Capillary Refill: Capillary refill takes less than 2 seconds. Neurological:      General: No focal deficit present. Mental Status: She is alert and oriented to person, place, and time.          DIAGNOSTIC RESULTS     EKG: All EKG's are interpreted by the Emergency Department Physician who either signs or Co-signs this chart in the absence of a cardiologist.        RADIOLOGY:   Non-plain film images such as CT, Ultrasound and MRI are read by the radiologist. Plain radiographic images are visualized and preliminarily interpreted by the emergency physician with the below findings:        Interpretation per the Radiologist below, if available at the time of this note:    No orders to display         ED BEDSIDE ULTRASOUND:   Performed by ED Physician - none    LABS:  Labs Reviewed - No data to display    All other labs were within normal range or not returned as of this dictation. EMERGENCY DEPARTMENT COURSE and DIFFERENTIAL DIAGNOSIS/MDM:   Vitals:    Vitals:    06/17/20 1326   BP: 117/67   Pulse: 100   Resp: 20   Temp: 98.4 °F (36.9 °C)   TempSrc: Temporal   SpO2: 100%   Weight: 107 lb (48.5 kg)   Height: 5' 7\" (1.702 m)       MDM     Patient is a 74-year-old female who presents to the ED to obtain a work excuse. She is afebrile and hemodynamically stable. Patient is stable for discharge. She was given a work note and encouraged to follow-up with primary care as needed or return to the ED for worsening symptoms. Patient understands and agrees to plan. All questions answered. REASSESSMENT          CRITICAL CARE TIME   Total Critical Care time was 0 minutes, excluding separately reportable procedures. There was a high probability of clinically significant/life threatening deterioration in the patient's condition which required my urgent intervention. CONSULTS:  None    PROCEDURES:  Unless otherwise noted below, none     Procedures        FINAL IMPRESSION      1. Encounter to obtain excuse from work          DISPOSITION/PLAN   DISPOSITION Decision To Discharge 06/17/2020 01:33:34 PM      PATIENT REFERRED TO:  [address] [place] [address].   [address] [place] [address]  [phone]    Schedule an appointment as soon as possible for a visit   As needed, If symptoms worsen    [facility]) ED  [address] [address]  [phone]  Go to   As needed, If symptoms worsen      DISCHARGE MEDICATIONS:  Discharge Medication List as of 6/17/2020  1:35 PM        Controlled Substances Monitoring:     RX Monitoring 7/29/2017   Attestation The Prescription Monitoring Report for this patient

## 2020-06-17 NOTE — ED TRIAGE NOTES
Patient arrived from home via self with complaint of needing a work excuse for work due to missing with complaints of emesis which patient found out she was pregnant. No distress noted. msp intact. Patient has no complaints at this time.

## 2020-08-21 ENCOUNTER — APPOINTMENT (OUTPATIENT)
Dept: ULTRASOUND IMAGING | Age: 25
End: 2020-08-21
Payer: COMMERCIAL

## 2020-08-21 ENCOUNTER — HOSPITAL ENCOUNTER (EMERGENCY)
Age: 25
Discharge: HOME | End: 2020-08-21
Attending: EMERGENCY MEDICINE
Payer: COMMERCIAL

## 2020-08-21 VITALS
SYSTOLIC BLOOD PRESSURE: 102 MMHG | BODY MASS INDEX: 16.48 KG/M2 | OXYGEN SATURATION: 100 % | HEIGHT: 67 IN | WEIGHT: 105 LBS | RESPIRATION RATE: 16 BRPM | DIASTOLIC BLOOD PRESSURE: 62 MMHG | HEART RATE: 82 BPM | TEMPERATURE: 97.9 F

## 2020-08-21 LAB
ABO/RH: NORMAL
ALBUMIN SERPL-MCNC: 4 G/DL (ref 3.5–4.6)
ALP BLD-CCNC: 79 U/L (ref 40–130)
ALT SERPL-CCNC: 9 U/L (ref 0–33)
ANION GAP SERPL CALCULATED.3IONS-SCNC: 8 MEQ/L (ref 9–15)
ANISOCYTOSIS: ABNORMAL
ANTIBODY SCREEN: NORMAL
AST SERPL-CCNC: 15 U/L (ref 0–35)
BACTERIA: ABNORMAL /HPF
BASOPHILS ABSOLUTE: 0 K/UL (ref 0–0.2)
BASOPHILS RELATIVE PERCENT: 0.1 %
BILIRUB SERPL-MCNC: 0.8 MG/DL (ref 0.2–0.7)
BILIRUBIN URINE: NEGATIVE
BLOOD, URINE: ABNORMAL
BUN BLDV-MCNC: 6 MG/DL (ref 6–20)
CALCIUM SERPL-MCNC: 9.1 MG/DL (ref 8.5–9.9)
CHLORIDE BLD-SCNC: 99 MEQ/L (ref 95–107)
CHP ED QC CHECK: YES
CLARITY: ABNORMAL
CO2: 22 MEQ/L (ref 20–31)
COLOR: YELLOW
CREAT SERPL-MCNC: 0.34 MG/DL (ref 0.5–0.9)
EOSINOPHILS ABSOLUTE: 0 K/UL (ref 0–0.7)
EOSINOPHILS RELATIVE PERCENT: 0.3 %
EPITHELIAL CELLS, UA: ABNORMAL /HPF (ref 0–5)
GFR AFRICAN AMERICAN: >60
GFR NON-AFRICAN AMERICAN: >60
GLOBULIN: 3.9 G/DL (ref 2.3–3.5)
GLUCOSE BLD-MCNC: 82 MG/DL (ref 70–99)
GLUCOSE URINE: NEGATIVE MG/DL
GONADOTROPIN, CHORIONIC (HCG) QUANT: NORMAL MIU/ML
HCT VFR BLD CALC: 34.8 % (ref 37–47)
HEMOGLOBIN: 11.2 G/DL (ref 12–16)
KETONES, URINE: 15 MG/DL
LEUKOCYTE ESTERASE, URINE: ABNORMAL
LYMPHOCYTES ABSOLUTE: 0.9 K/UL (ref 1–4.8)
LYMPHOCYTES RELATIVE PERCENT: 8.5 %
MCH RBC QN AUTO: 24.1 PG (ref 27–31.3)
MCHC RBC AUTO-ENTMCNC: 32.3 % (ref 33–37)
MCV RBC AUTO: 74.6 FL (ref 82–100)
MONOCYTES ABSOLUTE: 0.7 K/UL (ref 0.2–0.8)
MONOCYTES RELATIVE PERCENT: 6.8 %
NEUTROPHILS ABSOLUTE: 9.1 K/UL (ref 1.4–6.5)
NEUTROPHILS RELATIVE PERCENT: 84.3 %
NITRITE, URINE: NEGATIVE
OVALOCYTES: ABNORMAL
PDW BLD-RTO: 20 % (ref 11.5–14.5)
PH UA: 6 (ref 5–9)
PLATELET # BLD: 345 K/UL (ref 130–400)
PLATELET SLIDE REVIEW: NORMAL
POIKILOCYTES: ABNORMAL
POTASSIUM SERPL-SCNC: 4 MEQ/L (ref 3.4–4.9)
PREGNANCY TEST URINE, POC: POSITIVE
PROTEIN UA: ABNORMAL MG/DL
RBC # BLD: 4.67 M/UL (ref 4.2–5.4)
RBC UA: ABNORMAL /HPF (ref 0–5)
SODIUM BLD-SCNC: 129 MEQ/L (ref 135–144)
SPECIFIC GRAVITY UA: 1.02 (ref 1–1.03)
TEAR DROP CELLS: ABNORMAL
TOTAL PROTEIN: 7.9 G/DL (ref 6.3–8)
UROBILINOGEN, URINE: 1 E.U./DL
WBC # BLD: 10.7 K/UL (ref 4.8–10.8)
WBC UA: ABNORMAL /HPF (ref 0–5)

## 2020-08-21 PROCEDURE — 84702 CHORIONIC GONADOTROPIN TEST: CPT

## 2020-08-21 PROCEDURE — 81001 URINALYSIS AUTO W/SCOPE: CPT

## 2020-08-21 PROCEDURE — 36415 COLL VENOUS BLD VENIPUNCTURE: CPT

## 2020-08-21 PROCEDURE — 76805 OB US >/= 14 WKS SNGL FETUS: CPT

## 2020-08-21 PROCEDURE — 85025 COMPLETE CBC W/AUTO DIFF WBC: CPT

## 2020-08-21 PROCEDURE — 2580000003 HC RX 258: Performed by: EMERGENCY MEDICINE

## 2020-08-21 PROCEDURE — 86901 BLOOD TYPING SEROLOGIC RH(D): CPT

## 2020-08-21 PROCEDURE — 86900 BLOOD TYPING SEROLOGIC ABO: CPT

## 2020-08-21 PROCEDURE — 6370000000 HC RX 637 (ALT 250 FOR IP): Performed by: EMERGENCY MEDICINE

## 2020-08-21 PROCEDURE — 86850 RBC ANTIBODY SCREEN: CPT

## 2020-08-21 PROCEDURE — 99284 EMERGENCY DEPT VISIT MOD MDM: CPT

## 2020-08-21 PROCEDURE — 80053 COMPREHEN METABOLIC PANEL: CPT

## 2020-08-21 RX ORDER — ACETAMINOPHEN 500 MG
1000 TABLET ORAL ONCE
Status: COMPLETED | OUTPATIENT
Start: 2020-08-21 | End: 2020-08-21

## 2020-08-21 RX ORDER — 0.9 % SODIUM CHLORIDE 0.9 %
1000 INTRAVENOUS SOLUTION INTRAVENOUS ONCE
Status: COMPLETED | OUTPATIENT
Start: 2020-08-21 | End: 2020-08-21

## 2020-08-21 RX ORDER — ACETAMINOPHEN 500 MG
500 TABLET ORAL 4 TIMES DAILY PRN
Qty: 360 TABLET | Refills: 1 | Status: ON HOLD | OUTPATIENT
Start: 2020-08-21 | End: 2020-09-28 | Stop reason: HOSPADM

## 2020-08-21 RX ADMIN — SODIUM CHLORIDE 1000 ML: 9 INJECTION, SOLUTION INTRAVENOUS at 16:45

## 2020-08-21 RX ADMIN — ACETAMINOPHEN 1000 MG: 500 TABLET ORAL at 16:45

## 2020-08-21 ASSESSMENT — ENCOUNTER SYMPTOMS
VOMITING: 0
ABDOMINAL PAIN: 1
SORE THROAT: 0
NAUSEA: 0
BACK PAIN: 0
DIARRHEA: 0
COUGH: 0
SHORTNESS OF BREATH: 0

## 2020-08-21 ASSESSMENT — PAIN SCALES - GENERAL: PAINLEVEL_OUTOF10: 8

## 2020-08-21 ASSESSMENT — PAIN DESCRIPTION - LOCATION: LOCATION: ABDOMEN;BACK

## 2020-08-21 ASSESSMENT — PAIN DESCRIPTION - DESCRIPTORS: DESCRIPTORS: ACHING

## 2020-08-21 NOTE — ED TRIAGE NOTES
abd pain that radiates to back for the last 2 weeks. Pt states that she did have n&v a few days ago but since has subsided. Denies diarrhea. Pt rates pain at 8/10. Pt states that she is pregnant but was told that they were unable to see anything back in June.  Pt was suppose to get another US a few weeks ago but was unable to make it and did not reschedule it

## 2020-08-21 NOTE — ED NOTES
Pt kearns placed. Urine light in color. No complications.    Pt resting on ER Kayla Rosado, RN  08/21/20 8077

## 2020-08-21 NOTE — ED PROVIDER NOTES
[address] [facility] ED  eMERGENCYdEPARTMENT eNCOUnter      Pt Name: Cayla Núñez  MRN: [id number]  [place] 1995  Date of evaluation: 2020  Daryle Lucky, MD    CHIEF COMPLAINT           HPI  Cayla Núñez is a 23 y.o. female per chart review has no pmh presents to the ED with ab pain. Pt notes gradual onset, moderate, constant, sharp/stabbing, epigastric, suprapubic ab pain x 2 weeks. Pt denies fever, n/v, cp, sob, dysuria, diarrhea. Pt notes she is about 15 weeks OB however 1 month ago she was told it was a false pregnancy and an US was ordered. Pt never got that US due to lack of . ROS  Review of Systems   Constitutional: Negative for activity change, chills and fever. HENT: Negative for ear pain and sore throat. Eyes: Negative for visual disturbance. Respiratory: Negative for cough and shortness of breath. Cardiovascular: Negative for chest pain, palpitations and leg swelling. Gastrointestinal: Positive for abdominal pain. Negative for diarrhea, nausea and vomiting. Genitourinary: Negative for dysuria. Musculoskeletal: Negative for back pain. Skin: Negative for rash. Neurological: Negative for dizziness and weakness. Except as noted above the remainder of the review of systems was reviewed and negative.        PAST MEDICAL HISTORY     Past Medical History:   Diagnosis Date    Gestational hypertension, third trimester      uterine contractions in third trimester, antepartum 10/5/2016         SURGICAL HISTORY       Past Surgical History:   Procedure Laterality Date     SECTION           CURRENTMEDICATIONS       Discharge Medication List as of 2020  7:03 PM      CONTINUE these medications which have NOT CHANGED    Details   famotidine (PEPCID) 20 MG tablet Take 1 tablet by mouth daily, Disp-5 tablet, R-0Normal      ondansetron (ZOFRAN) 4 MG tablet Take 1 tablet by mouth every 8 hours as needed for Nausea, Disp-10 tablet, R-0Print             ALLERGIES     Patient has no known allergies. FAMILY HISTORY       Family History   Problem Relation Age of Onset    Diabetes Father           SOCIAL HISTORY       Social History     Socioeconomic History    Marital status: Single     Spouse name: None    Number of children: None    Years of education: None    Highest education level: None   Occupational History    None   Social Needs    Financial resource strain: None    Food insecurity     Worry: None     Inability: None    Transportation needs     Medical: None     Non-medical: None   Tobacco Use    Smoking status: Former Smoker     Types: Cigars    Smokeless tobacco: Never Used    Tobacco comment: black n milds   Substance and Sexual Activity    Alcohol use: Yes     Comment: occas    Drug use: No    Sexual activity: Yes     Partners: Male   Lifestyle    Physical activity     Days per week: None     Minutes per session: None    Stress: None   Relationships    Social connections     Talks on phone: None     Gets together: None     Attends Mandaen service: None     Active member of club or organization: None     Attends meetings of clubs or organizations: None     Relationship status: None    Intimate partner violence     Fear of current or ex partner: None     Emotionally abused: None     Physically abused: None     Forced sexual activity: None   Other Topics Concern    None   Social History Narrative    None         PHYSICAL EXAM       ED Triage Vitals [08/21/20 1538]   BP Temp Temp Source Pulse Resp SpO2 Height Weight   (!) 98/54 97.9 °F (36.6 °C) Temporal 98 18 99 % 5' 7\" (1.702 m) 105 lb (47.6 kg)       Physical Exam  Vitals signs and nursing note reviewed. Constitutional:       Appearance: She is well-developed. HENT:      Head: Normocephalic.       Right Ear: External ear normal.      Left Ear: External ear normal.   Eyes:      Conjunctiva/sclera: Conjunctivae normal.      Pupils: Pupils are equal, round, and reactive to light. Neck:      Musculoskeletal: Normal range of motion and neck supple. Cardiovascular:      Rate and Rhythm: Normal rate and regular rhythm. Heart sounds: Normal heart sounds. Pulmonary:      Effort: Pulmonary effort is normal.      Breath sounds: Normal breath sounds. Abdominal:      General: Bowel sounds are normal. There is no distension. Palpations: Abdomen is soft. Tenderness: There is generalized abdominal tenderness. Musculoskeletal: Normal range of motion. Skin:     General: Skin is warm and dry. Neurological:      Mental Status: She is alert and oriented to person, place, and time. MDM  22 yo female presents to the ED with ab pain. Pt is supposed to be 15 weeks OB. Pt given 1 L NS, PO tylenol in the ED. HCG positive. Labs remarkable for Na 129, Hb 11.2, HCG [id number]. US shows single IUP with variable breech with estimated gestational age of 12 weeks 6 days with . Pt reassessed and feeling better. Suspect pt's pain is possibly due to UTI vs breech presentation. Pt educated about ab pain in pregnancy, UTIs, breech presentations. Pt given prescription for augmentin, tylenol. Pt will f/u with ob/gyn. Pt understands plan. FINAL IMPRESSION      1. Abdominal pain during pregnancy in second trimester    2.  Acute cystitis without hematuria          DISPOSITION/PLAN   DISPOSITION Decision To Discharge 08/21/2020 07:01:49 PM        DISCHARGE MEDICATIONS:  [unfilled]         Diamante Brunner MD(electronically signed)  Attending Emergency Physician            Diamante Brunner MD  08/22/20 9014

## 2020-08-22 RX ORDER — AMOXICILLIN AND CLAVULANATE POTASSIUM 875; 125 MG/1; MG/1
1 TABLET, FILM COATED ORAL 2 TIMES DAILY
Qty: 14 TABLET | Refills: 0 | Status: SHIPPED | OUTPATIENT
Start: 2020-08-22 | End: 2020-08-29

## 2020-09-28 ENCOUNTER — HOSPITAL ENCOUNTER (OUTPATIENT)
Age: 25
Discharge: HOME | End: 2020-09-28
Attending: OBSTETRICS & GYNECOLOGY | Admitting: OBSTETRICS & GYNECOLOGY
Payer: COMMERCIAL

## 2020-09-28 ENCOUNTER — APPOINTMENT (OUTPATIENT)
Dept: ULTRASOUND IMAGING | Age: 25
End: 2020-09-28
Payer: COMMERCIAL

## 2020-09-28 VITALS
HEART RATE: 71 BPM | OXYGEN SATURATION: 100 % | SYSTOLIC BLOOD PRESSURE: 109 MMHG | TEMPERATURE: 99.1 F | DIASTOLIC BLOOD PRESSURE: 55 MMHG | RESPIRATION RATE: 18 BRPM

## 2020-09-28 LAB
AMPHETAMINE SCREEN, URINE: NORMAL
BACTERIA: ABNORMAL /HPF
BARBITURATE SCREEN URINE: NORMAL
BENZODIAZEPINE SCREEN, URINE: NORMAL
BILIRUBIN URINE: NEGATIVE
BLOOD, URINE: NEGATIVE
CANNABINOID SCREEN URINE: NORMAL
CLARITY: ABNORMAL
COCAINE METABOLITE SCREEN URINE: NORMAL
COLOR: YELLOW
EPITHELIAL CELLS, UA: ABNORMAL /HPF (ref 0–5)
GLUCOSE URINE: NEGATIVE MG/DL
HYALINE CASTS: ABNORMAL /HPF (ref 0–5)
KETONES, URINE: NEGATIVE MG/DL
LEUKOCYTE ESTERASE, URINE: ABNORMAL
Lab: NORMAL
METHADONE SCREEN, URINE: NORMAL
NITRITE, URINE: NEGATIVE
OPIATE SCREEN URINE: NORMAL
OXYCODONE URINE: NORMAL
PH UA: 6 (ref 5–9)
PHENCYCLIDINE SCREEN URINE: NORMAL
PROPOXYPHENE SCREEN: NORMAL
PROTEIN UA: 30 MG/DL
RBC UA: ABNORMAL /HPF (ref 0–5)
SPECIFIC GRAVITY UA: 1.03 (ref 1–1.03)
UROBILINOGEN, URINE: 1 E.U./DL
WBC UA: ABNORMAL /HPF (ref 0–5)

## 2020-09-28 PROCEDURE — 99281 EMR DPT VST MAYX REQ PHY/QHP: CPT | Performed by: OBSTETRICS & GYNECOLOGY

## 2020-09-28 PROCEDURE — 76815 OB US LIMITED FETUS(S): CPT

## 2020-09-28 PROCEDURE — 81001 URINALYSIS AUTO W/SCOPE: CPT

## 2020-09-28 PROCEDURE — 99283 EMERGENCY DEPT VISIT LOW MDM: CPT

## 2020-09-28 PROCEDURE — 80307 DRUG TEST PRSMV CHEM ANLYZR: CPT

## 2020-09-28 PROCEDURE — 87086 URINE CULTURE/COLONY COUNT: CPT

## 2020-09-28 NOTE — FLOWSHEET NOTE
Sent from [facility] due to Md unable to obtain Fetal Heart rate. Call placed to Dr Devin Gonzalez new orders obtained. Ua obtained and sent to lab.

## 2020-09-28 NOTE — ED TRIAGE NOTES
OB/ER Note ( see records from [facility] on file ):  SUBJECTIVE:  22 y/o  female whose DEVON was 21; now 20w2d gest and sent to main ER by Dr. Cande Bridges at [facility] after he was unable to doppler FHT's in office earlier today; at this time patient is not feeling FM and states she does not feel pregnant; last ob sono was at 14-15 wks gest in  which showed viable IUP; has very high risk ob hx/o recurrent PTD's and is s/p  x's 2 at [address] [place] [address]; has not been able to get past 34 wks gest and 1st  was for breech in PTL at 32 wks; also had episode of PIH in past.  ROS-no dysuria, vag bleeding or ROM. OBJECTIVE:  BP (!) 109/55   Pulse 71   Temp 99.1 °F (37.3 °C) (Oral)   Resp 18   LMP 2020   SpO2 100%   Uterus palpable 3 FB below umbilicus. UA-mod leuks; 1+ protein; no nitrites; micro shows 20-50 WBC's and 10-20 Epi's; few bacteria ( ? Contamination ; will add on uc ). OB Sono: shows viable IUP at 20 wks gest with nl VIKA and cx-3.2 cm in length and closed; no anomalies seen. ASSESSMENT:  IUP at 20+ wks gest, ; prior hx/o recurrent PTD's ( 2 's and 2 c-sections; 34 wks is furthest gest achieved ); r/o UTI ( doubt-uc pending ); stable. PLAN:  Home to rest; increase fluids and tylenol prn; call for appt with Dr. Leonie Anne this Wednesday to review ob sono done today and discuss high risk pregnancy issue of recurrent PTD's ( also check fu uc at that time ); report any further issues.   Agus Harding MD

## 2020-09-30 LAB — URINE CULTURE, ROUTINE: NORMAL

## 2020-12-27 ENCOUNTER — HOSPITAL ENCOUNTER (OUTPATIENT)
Age: 25
Discharge: ANOTHER HOSPITAL | End: 2020-12-27
Attending: OBSTETRICS & GYNECOLOGY | Admitting: OBSTETRICS & GYNECOLOGY
Payer: COMMERCIAL

## 2020-12-27 ENCOUNTER — APPOINTMENT (OUTPATIENT)
Dept: ULTRASOUND IMAGING | Age: 25
End: 2020-12-27
Payer: COMMERCIAL

## 2020-12-27 VITALS
HEART RATE: 59 BPM | TEMPERATURE: 98.3 F | HEIGHT: 67 IN | RESPIRATION RATE: 16 BRPM | BODY MASS INDEX: 16.45 KG/M2 | DIASTOLIC BLOOD PRESSURE: 75 MMHG | SYSTOLIC BLOOD PRESSURE: 124 MMHG

## 2020-12-27 PROBLEM — Z3A.33 33 WEEKS GESTATION OF PREGNANCY: Status: ACTIVE | Noted: 2020-12-27

## 2020-12-27 LAB
ABO/RH: NORMAL
AMPHETAMINE SCREEN, URINE: NORMAL
ANTIBODY SCREEN: NORMAL
BARBITURATE SCREEN URINE: NORMAL
BASOPHILS ABSOLUTE: 0 K/UL (ref 0–0.2)
BASOPHILS RELATIVE PERCENT: 0.6 %
BENZODIAZEPINE SCREEN, URINE: NORMAL
BILIRUBIN URINE: NEGATIVE
BLOOD, URINE: NEGATIVE
CANNABINOID SCREEN URINE: NORMAL
CLARITY: CLEAR
CLUE CELLS: ABNORMAL
COCAINE METABOLITE SCREEN URINE: NORMAL
COLOR: YELLOW
EOSINOPHILS ABSOLUTE: 0 K/UL (ref 0–0.7)
EOSINOPHILS RELATIVE PERCENT: 0.4 %
EPITHELIAL CELLS, UA: ABNORMAL /HPF
GLUCOSE URINE: NEGATIVE MG/DL
HCT VFR BLD CALC: 37.5 % (ref 37–47)
HEMOGLOBIN: 11.5 G/DL (ref 12–16)
HEPATITIS B SURFACE ANTIGEN INTERPRETATION: NORMAL
HYPOCHROMIA: ABNORMAL
KETONES, URINE: ABNORMAL MG/DL
LEUKOCYTE ESTERASE, URINE: NEGATIVE
LYMPHOCYTES ABSOLUTE: 1.2 K/UL (ref 1–4.8)
LYMPHOCYTES RELATIVE PERCENT: 18.5 %
Lab: NORMAL
MCH RBC QN AUTO: 22.8 PG (ref 27–31.3)
MCHC RBC AUTO-ENTMCNC: 30.7 % (ref 33–37)
MCV RBC AUTO: 74.4 FL (ref 82–100)
METHADONE SCREEN, URINE: NORMAL
MICROCYTES: ABNORMAL
MONOCYTES ABSOLUTE: 0.5 K/UL (ref 0.2–0.8)
MONOCYTES RELATIVE PERCENT: 7.3 %
NEUTROPHILS ABSOLUTE: 4.9 K/UL (ref 1.4–6.5)
NEUTROPHILS RELATIVE PERCENT: 73.2 %
NITRITE, URINE: NEGATIVE
OPIATE SCREEN URINE: NORMAL
OXYCODONE URINE: NORMAL
PDW BLD-RTO: 19.3 % (ref 11.5–14.5)
PH UA: 7 (ref 5–9)
PHENCYCLIDINE SCREEN URINE: NORMAL
PLACENTA ALPHA MICROGLOBULIN-1: POSITIVE
PLATELET # BLD: 314 K/UL (ref 130–400)
POIKILOCYTES: ABNORMAL
PROPOXYPHENE SCREEN: NORMAL
PROTEIN UA: 30 MG/DL
RAPID HIV 1&2: NEGATIVE
RBC # BLD: 5.05 M/UL (ref 4.2–5.4)
RBC UA: ABNORMAL /HPF (ref 0–2)
SARS-COV-2, NAAT: NOT DETECTED
SPECIFIC GRAVITY UA: 1.02 (ref 1–1.03)
TRICHOMONAS PREP: ABNORMAL
TRICHOMONAS VAGINALIS SCREEN: NEGATIVE
UROBILINOGEN, URINE: 1 E.U./DL
WBC # BLD: 6.7 K/UL (ref 4.8–10.8)
WBC UA: ABNORMAL /HPF (ref 0–5)
YEAST WET PREP: ABNORMAL
YEAST: PRESENT /HPF

## 2020-12-27 PROCEDURE — 36415 COLL VENOUS BLD VENIPUNCTURE: CPT

## 2020-12-27 PROCEDURE — 59025 FETAL NON-STRESS TEST: CPT | Performed by: OBSTETRICS & GYNECOLOGY

## 2020-12-27 PROCEDURE — 87591 N.GONORRHOEAE DNA AMP PROB: CPT

## 2020-12-27 PROCEDURE — U0002 COVID-19 LAB TEST NON-CDC: HCPCS

## 2020-12-27 PROCEDURE — 87210 SMEAR WET MOUNT SALINE/INK: CPT

## 2020-12-27 PROCEDURE — 2580000003 HC RX 258: Performed by: OBSTETRICS & GYNECOLOGY

## 2020-12-27 PROCEDURE — 86900 BLOOD TYPING SEROLOGIC ABO: CPT

## 2020-12-27 PROCEDURE — 80307 DRUG TEST PRSMV CHEM ANLYZR: CPT

## 2020-12-27 PROCEDURE — 84112 EVAL AMNIOTIC FLUID PROTEIN: CPT

## 2020-12-27 PROCEDURE — 86592 SYPHILIS TEST NON-TREP QUAL: CPT

## 2020-12-27 PROCEDURE — 96372 THER/PROPH/DIAG INJ SC/IM: CPT

## 2020-12-27 PROCEDURE — 87808 TRICHOMONAS ASSAY W/OPTIC: CPT

## 2020-12-27 PROCEDURE — 86850 RBC ANTIBODY SCREEN: CPT

## 2020-12-27 PROCEDURE — 6360000002 HC RX W HCPCS

## 2020-12-27 PROCEDURE — 85025 COMPLETE CBC W/AUTO DIFF WBC: CPT

## 2020-12-27 PROCEDURE — 87081 CULTURE SCREEN ONLY: CPT

## 2020-12-27 PROCEDURE — 81001 URINALYSIS AUTO W/SCOPE: CPT

## 2020-12-27 PROCEDURE — 86703 HIV-1/HIV-2 1 RESULT ANTBDY: CPT

## 2020-12-27 PROCEDURE — 96375 TX/PRO/DX INJ NEW DRUG ADDON: CPT

## 2020-12-27 PROCEDURE — 86901 BLOOD TYPING SEROLOGIC RH(D): CPT

## 2020-12-27 PROCEDURE — 99285 EMERGENCY DEPT VISIT HI MDM: CPT

## 2020-12-27 PROCEDURE — 6360000002 HC RX W HCPCS: Performed by: OBSTETRICS & GYNECOLOGY

## 2020-12-27 PROCEDURE — 87340 HEPATITIS B SURFACE AG IA: CPT

## 2020-12-27 PROCEDURE — 76815 OB US LIMITED FETUS(S): CPT

## 2020-12-27 PROCEDURE — 87491 CHLMYD TRACH DNA AMP PROBE: CPT

## 2020-12-27 PROCEDURE — 96365 THER/PROPH/DIAG IV INF INIT: CPT

## 2020-12-27 PROCEDURE — 99215 OFFICE O/P EST HI 40 MIN: CPT | Performed by: OBSTETRICS & GYNECOLOGY

## 2020-12-27 RX ORDER — ONDANSETRON 2 MG/ML
4 INJECTION INTRAMUSCULAR; INTRAVENOUS EVERY 6 HOURS PRN
Status: DISCONTINUED | OUTPATIENT
Start: 2020-12-27 | End: 2020-12-28 | Stop reason: HOSPADM

## 2020-12-27 RX ORDER — PROMETHAZINE HYDROCHLORIDE 12.5 MG/1
12.5 TABLET ORAL EVERY 6 HOURS PRN
Status: DISCONTINUED | OUTPATIENT
Start: 2020-12-27 | End: 2020-12-28 | Stop reason: HOSPADM

## 2020-12-27 RX ORDER — BETAMETHASONE SODIUM PHOSPHATE AND BETAMETHASONE ACETATE 3; 3 MG/ML; MG/ML
12 INJECTION, SUSPENSION INTRA-ARTICULAR; INTRALESIONAL; INTRAMUSCULAR; SOFT TISSUE ONCE
Status: DISCONTINUED | OUTPATIENT
Start: 2020-12-27 | End: 2020-12-28 | Stop reason: HOSPADM

## 2020-12-27 RX ORDER — ACETAMINOPHEN 650 MG/1
650 SUPPOSITORY RECTAL EVERY 4 HOURS PRN
Status: DISCONTINUED | OUTPATIENT
Start: 2020-12-27 | End: 2020-12-28 | Stop reason: HOSPADM

## 2020-12-27 RX ORDER — PENICILLIN G 3000000 [IU]/50ML
3 INJECTION, SOLUTION INTRAVENOUS EVERY 4 HOURS
Status: DISCONTINUED | OUTPATIENT
Start: 2020-12-27 | End: 2020-12-28 | Stop reason: HOSPADM

## 2020-12-27 RX ORDER — AMOXICILLIN 500 MG/1
500 CAPSULE ORAL 3 TIMES DAILY
Status: DISCONTINUED | OUTPATIENT
Start: 2020-12-30 | End: 2020-12-28 | Stop reason: HOSPADM

## 2020-12-27 RX ORDER — SODIUM CHLORIDE 0.9 % (FLUSH) 0.9 %
10 SYRINGE (ML) INJECTION PRN
Status: DISCONTINUED | OUTPATIENT
Start: 2020-12-27 | End: 2020-12-28 | Stop reason: HOSPADM

## 2020-12-27 RX ORDER — MAGNESIUM SULFATE IN WATER 40 MG/ML
4 INJECTION, SOLUTION INTRAVENOUS ONCE
Status: COMPLETED | OUTPATIENT
Start: 2020-12-27 | End: 2020-12-27

## 2020-12-27 RX ORDER — ACETAMINOPHEN 325 MG/1
650 TABLET ORAL EVERY 4 HOURS PRN
Status: DISCONTINUED | OUTPATIENT
Start: 2020-12-27 | End: 2020-12-28 | Stop reason: HOSPADM

## 2020-12-27 RX ORDER — BETAMETHASONE SODIUM PHOSPHATE AND BETAMETHASONE ACETATE 3; 3 MG/ML; MG/ML
12 INJECTION, SUSPENSION INTRA-ARTICULAR; INTRALESIONAL; INTRAMUSCULAR; SOFT TISSUE ONCE
Status: COMPLETED | OUTPATIENT
Start: 2020-12-27 | End: 2020-12-27

## 2020-12-27 RX ORDER — BETAMETHASONE SODIUM PHOSPHATE AND BETAMETHASONE ACETATE 3; 3 MG/ML; MG/ML
INJECTION, SUSPENSION INTRA-ARTICULAR; INTRALESIONAL; INTRAMUSCULAR; SOFT TISSUE
Status: COMPLETED
Start: 2020-12-27 | End: 2020-12-27

## 2020-12-27 RX ORDER — AMOXICILLIN 500 MG/1
500 CAPSULE ORAL 3 TIMES DAILY
Status: DISCONTINUED | OUTPATIENT
Start: 2020-12-30 | End: 2020-12-27

## 2020-12-27 RX ORDER — SODIUM CHLORIDE 0.9 % (FLUSH) 0.9 %
10 SYRINGE (ML) INJECTION EVERY 12 HOURS SCHEDULED
Status: DISCONTINUED | OUTPATIENT
Start: 2020-12-27 | End: 2020-12-28 | Stop reason: HOSPADM

## 2020-12-27 RX ORDER — SODIUM CHLORIDE, SODIUM LACTATE, POTASSIUM CHLORIDE, CALCIUM CHLORIDE 600; 310; 30; 20 MG/100ML; MG/100ML; MG/100ML; MG/100ML
INJECTION, SOLUTION INTRAVENOUS CONTINUOUS
Status: DISCONTINUED | OUTPATIENT
Start: 2020-12-27 | End: 2020-12-28 | Stop reason: HOSPADM

## 2020-12-27 RX ADMIN — SODIUM CHLORIDE, POTASSIUM CHLORIDE, SODIUM LACTATE AND CALCIUM CHLORIDE: 600; 310; 30; 20 INJECTION, SOLUTION INTRAVENOUS at 20:16

## 2020-12-27 RX ADMIN — ONDANSETRON 4 MG: 2 INJECTION INTRAMUSCULAR; INTRAVENOUS at 20:36

## 2020-12-27 RX ADMIN — MAGNESIUM SULFATE HEPTAHYDRATE 4 G: 40 INJECTION, SOLUTION INTRAVENOUS at 20:16

## 2020-12-27 RX ADMIN — BETAMETHASONE SODIUM PHOSPHATE AND BETAMETHASONE ACETATE 12 MG: 3; 3 INJECTION, SUSPENSION INTRA-ARTICULAR; INTRALESIONAL; INTRAMUSCULAR at 19:37

## 2020-12-27 RX ADMIN — MAGNESIUM SULFATE HEPTAHYDRATE 2 G/HR: 40 INJECTION, SOLUTION INTRAVENOUS at 20:36

## 2020-12-27 RX ADMIN — BETAMETHASONE SODIUM PHOSPHATE AND BETAMETHASONE ACETATE 12 MG: 3; 3 INJECTION, SUSPENSION INTRA-ARTICULAR; INTRALESIONAL; INTRAMUSCULAR; SOFT TISSUE at 19:37

## 2020-12-27 RX ADMIN — AMPICILLIN SODIUM 2 G: 2 INJECTION, POWDER, FOR SOLUTION INTRAMUSCULAR; INTRAVENOUS at 21:29

## 2020-12-27 NOTE — PROGRESS NOTES
Pt here via squad with c/o water breaking at around 0330 . 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 Pt states it was large gush initially and that she has been leaking clear fluid all day. Pt had some mucous earlier with small amnt blood. Has felt fetal movement. Pt with 2 prev c/s and hx of pre term deliveries. Pt denies sex in past 24 hours.

## 2020-12-28 NOTE — H&P
and appears stated age  Neurologic:  Awake, alert, oriented to name, place and time. Cranial nerves II-XII are grossly intact. Motor is 5 out of 5 bilaterally. Cerebellar finger to nose, heel to shin intact. Sensory is intact.   Babinski down going, Romberg negative, and gait is normal.  Lungs:  No increased work of breathing, good air exchange, clear to auscultation bilaterally, no crackles or wheezing  Heart:  Normal apical impulse, regular rate and rhythm, normal S1 and S2, no S3 or S4, and no murmur noted  Abdomen:  No scars, normal bowel sounds, soft, non-distended, non-tender, no masses palpated, no hepatosplenomegally  Fetal heart rate:  Baseline Heart Rate 130, accelerations:  present  Pelvis:  External Genitalia: General appearance; normal, Hair distribution; normal, Lesions absent  Anus/Perineum: Lesions absent and Masses absent  Cervix:    DILATION:  2 cm  EFFACEMENT:   75%  STATION:  +1 cm  CONSISTENCY:  medium  POSITION:  posterior      Contraction frequency:  4 minutes  Membranes:  Ruptured clear fluid        General Labs:   Admission on 12/27/2020   Component Date Value Ref Range Status    Placenta Alpha Microglobulin-1 12/27/2020 POSITIVE* Negative Final    Color, UA 12/27/2020 Yellow  Straw/Yellow Final    Clarity, UA 12/27/2020 Clear  Clear Final    Glucose, Ur 12/27/2020 Negative  Negative mg/dL Final    Bilirubin Urine 12/27/2020 Negative  Negative Final    Ketones, Urine 12/27/2020 TRACE* Negative mg/dL Final    Specific [place], UA 12/27/2020 1.023  1.005 - 1.030 Final    Blood, Urine 12/27/2020 Negative  Negative Final    pH, UA 12/27/2020 7.0  5.0 - 9.0 Final    Protein, UA 12/27/2020 30* Negative mg/dL Final    Urobilinogen, Urine 12/27/2020 1.0  <2.0 E.U./dL Final    Nitrite, Urine 12/27/2020 Negative  Negative Final    Leukocyte Esterase, Urine 12/27/2020 Negative  Negative Final    Amphetamine Screen, Urine 12/27/2020 Neg  Negative <1000 ng/mL Final    Barbiturate

## 2020-12-28 NOTE — FLOWSHEET NOTE
2021 Call to Dr. Chris Rivera to inform him of estimated fetal weight, baby measuring at 29 weeks, and lack of fulid. Recommends transferring to a level 3 facility. 2023 Call to Dr. Leah Rodríguez to inform him of recommendation to transfer. Dr. Leah Rodríguez agreeable to plan.   2024 Call to Dr. Brennen Cid to inform him of plan, he states he will be out shortly to initiate transfer

## 2020-12-28 NOTE — H&P
Department of Obstetrics and Gynecology  Attending Obstetrics History and Physical        CHIEF COMPLAINT:  leakage of amniotic fluid    HISTORY OF PRESENT ILLNESS:      The patient is a 23 y.o. [id number] @ 33w1d. Patient presents with C/O leaking fluid since 3:30 am. H/O PTD Prior C/S x 2 . Ist for failure to progress and second for fetal intolerance to labor. Positive fetal movement. No vaginal bleeding. DATES:  2021, Date entered prior to episode creation    PRENATAL CARE:    Provider :      Labs:  Blood Type/Rh: A POS   Hepatitis B Surface Antigen:   Hep B S Ag Interp   Date Value Ref Range Status   2016 Non-reactive  Final     Hepatitis C: No results found for: HCVABI  HIV: No results found for: HIV1X2  Group B Strep:  pending      PAST OB HISTORY       OB History    Para Term  AB Living   4 3 0 3 0 3   SAB TAB Ectopic Molar Multiple Live Births   0 0 0   0 3      # Outcome Date GA Lbr Billy/2nd Weight Sex Delivery Anes PTL Lv   4 Current            3  18 32w0d   F CS-LTranv   DAYA      Complications: Breech birth   2  / 34w1d  3 lb 10.1 oz (1.646 kg) F Vag-Spont None Y DAYA   1  07/31/15 34w0d  4 lb 10 oz (2.098 kg) M Vag-Spont EPI N DAYA      Complications: Pregnancy induced hypertension      Obstetric Comments   Patient states she was induced at 34 weeks due to [facility]   Second child  labor, Kim Long was coming out in her sack\"       Past Medical History:        Diagnosis Date    Gestational hypertension, third trimester      uterine contractions in third trimester, antepartum 10/5/2016     Past Surgical History:        Procedure Laterality Date     SECTION       Social History:    TOBACCO:   reports that she has quit smoking. Her smoking use included cigars. She has never used smokeless tobacco.  ETOH:   reports current alcohol use. DRUGS:   reports no history of drug use.   SEXUAL HISTORY:    DOMESTIC VIOLENCE:  no  ACTIVITIES OF DAILY LIVING:    INSTRUMENTAL ACTIVITIES OF DAILY LIVING:  Patient is able to perform all instrumental activities of daily living. MARITAL STATUS:    OCCUPATION:    LEVEL OF EDUCATION:    Patient currently lives with family   Environmental/chemical exposure:  None  Travel History: None    Family History:       Problem Relation Age of Onset    Diabetes Father      Medications Prior to Admission:  No medications prior to admission. Allergies:  Patient has no known allergies. REVIEW OF SYSTEMS:    Patient has no history of depression. Patient has no symptoms of depression  CONSTITUTIONAL:  negative  EYES:  negative  HEENT:  negative  RESPIRATORY:  negative  CARDIOVASCULAR:  negative  GASTROINTESTINAL:  negative  GENITOURINARY:  negative  INTEGUMENT/BREAST:  negative  HEMATOLOGIC/LYMPHATIC:  negative  ALLERGIC/IMMUNOLOGIC:  negative  ENDOCRINE:  negative  MUSCULOSKELETAL:  negative  NEUROLOGICAL:  negative  BEHAVIOR/PSYCH:  negative    PHYSICAL EXAM:    General appearance:  awake, alert, cooperative, no apparent distress, and appears stated age  Neurologic:  Awake, alert, oriented to name, place and time. Cranial nerves II-XII are grossly intact. Motor is 5 out of 5 bilaterally. Cerebellar finger to nose, heel to shin intact. Sensory is intact.   Babinski down going, Romberg negative, and gait is normal.  Lungs:  No increased work of breathing, good air exchange, clear to auscultation bilaterally, no crackles or wheezing  Heart:  Normal apical impulse, regular rate and rhythm, normal S1 and S2, no S3 or S4, and no murmur noted  Abdomen:  No scars, normal bowel sounds, soft, non-distended, non-tender, no masses palpated, no hepatosplenomegally  Fetal heart rate:  Baseline Heart Rate 120, accelerations:  present  Pelvis:  External Genitalia: General appearance; normal, Hair distribution; normal, Lesions absent  Cervix:    DILATION:  3 cm  EFFACEMENT:   50%  STATION:  +1 cm  CONSISTENCY:  soft  POSITION: anterior      Contraction frequency:  ocassional minutes  Membranes:  Ruptured clear fluid Amnisure positive     Ultrasound:    General Labs:   Admission on 12/27/2020   Component Date Value Ref Range Status    Placenta Alpha Microglobulin-1 12/27/2020 POSITIVE* Negative Final    Color, UA 12/27/2020 Yellow  Straw/Yellow Final    Clarity, UA 12/27/2020 Clear  Clear Final    Glucose, Ur 12/27/2020 Negative  Negative mg/dL Final    Bilirubin Urine 12/27/2020 Negative  Negative Final    Ketones, Urine 12/27/2020 TRACE* Negative mg/dL Final    Specific [place], UA 12/27/2020 1.023  1.005 - 1.030 Final    Blood, Urine 12/27/2020 Negative  Negative Final    pH, UA 12/27/2020 7.0  5.0 - 9.0 Final    Protein, UA 12/27/2020 30* Negative mg/dL Final    Urobilinogen, Urine 12/27/2020 1.0  <2.0 E.U./dL Final    Nitrite, Urine 12/27/2020 Negative  Negative Final    Leukocyte Esterase, Urine 12/27/2020 Negative  Negative Final    Amphetamine Screen, Urine 12/27/2020 Neg  Negative <1000 ng/mL Final    Barbiturate Screen, Ur 12/27/2020 Neg  Negative < 200 ng/mL Final    Benzodiazepine Screen, Urine 12/27/2020 Neg  Negative < 200 ng/mL Final    Cannabinoid Scrn, Ur 12/27/2020 Neg  Negative < 50 ng/mL Final    Cocaine Metabolite Screen, Urine 12/27/2020 Neg  Negative < 300 ng/mL Final    Opiate Scrn, Ur 12/27/2020 Neg  Negative < 300 ng/mL Final    PCP Screen, Urine 12/27/2020 Neg  Negative < 25 ng/mL Final    Methadone Screen, Urine 12/27/2020 Neg  Negative <300 ng/mL Final    Comment: Effective:  9/9/19  New Test for Qualitative Drug Screen.  Propoxyphene Scrn, Ur 12/27/2020 Neg  Negative <300 ng/mL Final    Comment: Effective:  9/9/19  New Test for Qualitative Drug Screen.  Oxycodone Urine 12/27/2020 Neg  Negative <100 ng/mL Final    Comment: Effective:  9/9/19  New Test for Qualitative Drug Screen.  Drug Screen Comment: 12/27/2020 see below   Final    Comment:  This method is a screening test to detect only these drug  classes as part of a medical workup. Confirmatory testing  by another method should be ordered if clinically indicated.  WBC, UA 12/27/2020 3-5  0 - 5 /HPF Final    RBC, UA 12/27/2020 0-2  0 - 2 /HPF Final    Epithelial Cells, UA 12/27/2020 3-5  /HPF Final    Yeast, UA 12/27/2020 Present* None Seen /HPF Final    SARS-CoV-2, NAAT 12/27/2020 Not Detected  Not Detected Final    Comment: Rapid NAAT:   Negative results should be treated as presumptive and,  if inconsistent with clinical signs and symptoms or necessary for  patient management, should be tested with an alternative molecular  assay. Negative results do not preclude SARS-CoV-2 infection and  should not be used as the sole basis for patient management decisions. This test has been authorized by the FDA under an Emergency Use  Authorization (EUA) for use by authorized laboratories.     Fact sheet for Healthcare Providers:  Arielle.es  Fact sheet for Patients: Arielle.es    METHODOLOGY: Isothermal Nucleic Acid Amplification      Trichomonas Prep 12/27/2020 See EIA   Final    Yeast, Wet Prep 12/27/2020 1+*  Final    Clue Cells, Wet Prep 12/27/2020 None Seen   Final    WBC 12/27/2020 6.7  4.8 - 10.8 K/uL Final    RBC 12/27/2020 5.05  4.20 - 5.40 M/uL Final    Hemoglobin 12/27/2020 11.5* 12.0 - 16.0 g/dL Final    Hematocrit 12/27/2020 37.5  37.0 - 47.0 % Final    MCH 12/27/2020 22.8* 27.0 - 31.3 pg Final    MCHC 12/27/2020 30.7* 33.0 - 37.0 % Final    RDW 12/27/2020 19.3* 11.5 - 14.5 % Final    Platelets 81/23/6036 314  130 - 400 K/uL Final    Neutrophils % 12/27/2020 73.2  % Final    Lymphocytes % 12/27/2020 18.5  % Final    Monocytes % 12/27/2020 7.3  % Final    Eosinophils % 12/27/2020 0.4  % Final    Basophils % 12/27/2020 0.6  % Final    Neutrophils Absolute 12/27/2020 4.9  1.4 - 6.5 K/uL Final    Lymphocytes Absolute 12/27/2020 1.2  1.0 - 4.8 K/uL Final    Monocytes Absolute 2020 0.5  0.2 - 0.8 K/uL Final    Eosinophils Absolute 2020 0.0  0.0 - 0.7 K/uL Final    Basophils Absolute 2020 0.0  0.0 - 0.2 K/uL Final    TRICHOMONAS VAGINALIS SCREEN 2020 Negative   Final       ASSESSMENT AND PLAN:    The patient is a 23 y.o. [id number] @ 33w1d. Patient Active Problem List    Diagnosis Date Noted    33 weeks gestation of pregnancy 2020    Ruptured membranes, prolonged     Non-reactive NST (non-stress test)     High-risk pregnancy in second trimester     20 weeks gestation of pregnancy     Previous  delivery, antepartum      labor in second trimester with  delivery     Threatened  labor, antepartum 2018    Gestational hypertension, third trimester     Proteinuria affecting pregnancy in third trimester     Pelvic pain affecting pregnancy in third trimester, antepartum     Low back pain during pregnancy in third trimester     Urinary tract infection during pregnancy     Hx of  delivery, currently pregnant      uterine contractions in third trimester, antepartum 10/05/2016     1) IUP at 33.1 weeks by 14w6d ultrasound  2) PPROM  3) Latency antibiotics (First dose Ampicillin 2 g given)  4) GBS culture sent  5) Magnesium for neuro protection  6) Betamethasone x 1 for lung maturity ( 1937hrs 2020)  7) US 1276 g, IVKA 0, Breech,   8) Covid 19 testing 2020 Negative    Geremias Lenz MBA, [facility]  2020

## 2020-12-29 LAB
C TRACH DNA GENITAL QL NAA+PROBE: NEGATIVE
N. GONORRHOEAE DNA: NEGATIVE
RPR: NORMAL

## 2020-12-30 LAB — GROUP B STREP CULTURE: NORMAL

## 2021-06-18 NOTE — ED NOTES
Patient given DC instructions education and scripts  IV discontinued  Up with steady gait  Denies having any questions at time of [address], RN  08/21/20 4899 nose/mouth/pharynx detailed exam

## 2021-06-21 NOTE — PROGRESS NOTES
2021    Mirella Gray (:  1995) is a 23 y.o. female, here for evaluation of the following medical concerns:  Chief Complaint   Patient presents with    Rash     States since she has been 14 she gets a circular rash every day that starts burning. States it affects her BP. States she was told at ED that it could be a form of lupus. States the rash sometimes appears on the tongue, and bottom of feet that pt can't walk. Pt has tried benadryl and zyrtec. HPI  Recurrent rash  Occurring off and on for 10 years  Seems to be getting worse over time  States she was told in the ER it might be lupus  Prior to the rash occurring she states to get itching and burning on her body  Rash when it occurs, is on her tongue, hands and palms    Oral steroids calm the rash down for a few days    Uses all fragrance free products, only uses dove sensitive skin  Zyrtec and benadryl do not help    Occasional joint pain  No known family history of autoimmune disease    Review of Systems   Constitutional: Negative for chills and fever. HENT: Negative for congestion, sinus pressure and sore throat. Respiratory: Negative for cough and shortness of breath. Cardiovascular: Negative for chest pain and palpitations. Gastrointestinal: Negative for abdominal pain and vomiting. Musculoskeletal: Negative for arthralgias and myalgias. Skin: Positive for rash. Negative for wound. Neurological: Negative for speech difficulty and light-headedness. Psychiatric/Behavioral: Negative for suicidal ideas. The patient is not nervous/anxious. Prior to Visit Medications    Medication Sig Taking?  Authorizing Provider   PREDNISONE PO Take by mouth Yes Historical Provider, MD        No Known Allergies    Past Medical History:   Diagnosis Date    Gestational hypertension, third trimester      uterine contractions in third trimester, antepartum 10/5/2016       Past Surgical History:   Procedure Laterality Date 7\" (1.702 m). Weight as of this encounter: 109 lb (49.4 kg). No results for input(s): WBC, RBC, HGB, HCT, MCV, MCH, MCHC, RDW, PLT, MPV in the last 72 hours. No results for input(s): NA, K, CL, CO2, BUN, CREATININE, GLUCOSE, CALCIUM, PROT, LABALBU, BILITOT, ALKPHOS, AST, ALT in the last 72 hours. No results found for: LABA1C    No results found. Physical Exam  Constitutional:       Appearance: Normal appearance. She is normal weight. HENT:      Head: Normocephalic and atraumatic. Eyes:      Extraocular Movements: Extraocular movements intact. Conjunctiva/sclera: Conjunctivae normal.   Skin:     General: Skin is warm. Capillary Refill: Capillary refill takes less than 2 seconds. Findings: No rash. Comments: Current rash, patient does have pictures and video of rash which is erythematous discoid lesions, pictures include lesions on the face, neck and chest   Neurological:      Mental Status: She is alert. Psychiatric:         Mood and Affect: Mood normal.         Thought Content: Thought content normal.         Judgment: Judgment normal.         ASSESSMENT/PLAN:  1. Rash and nonspecific skin eruption  -We will get autoimmune work-up at this time as rash has been occurring for over 10 years now and does have discoid appearance on pictures patient has on her phone  -After rash she does not have any scarring or hypopigmentation  -She has tried Zyrtec and Benadryl without relief in symptoms  -The only thing that has seemed to provide some benefit is oral steroids  -Follow-up in 2 weeks to review lab work    - Donato (SHERYL) Antibody IgG; Future  - SHERYL 2 - Anti SSA & Anti SSB; Future  - Sjogrens syndrome-A extractable nuclear antibody; Future  - Sjogrens syndrome-B extractable nuclear antibody; Future  - Anti-Scleroderma Antibody; Future  - Anti-Gianna 1 Antibody, IgG; Future  - MILENA Screen with Reflex;  Future  - CBC With Auto Differential; Future  - Comprehensive Metabolic Panel;

## 2021-06-22 ENCOUNTER — OFFICE VISIT (OUTPATIENT)
Dept: FAMILY MEDICINE CLINIC | Age: 26
End: 2021-06-22
Payer: COMMERCIAL

## 2021-06-22 VITALS
TEMPERATURE: 96.3 F | WEIGHT: 109 LBS | BODY MASS INDEX: 17.11 KG/M2 | HEIGHT: 67 IN | HEART RATE: 90 BPM | OXYGEN SATURATION: 98 %

## 2021-06-22 DIAGNOSIS — R21 RASH AND NONSPECIFIC SKIN ERUPTION: ICD-10-CM

## 2021-06-22 DIAGNOSIS — R21 RASH AND NONSPECIFIC SKIN ERUPTION: Primary | ICD-10-CM

## 2021-06-22 LAB
ALBUMIN SERPL-MCNC: 4.6 G/DL (ref 3.5–4.6)
ALP BLD-CCNC: 93 U/L (ref 40–130)
ALT SERPL-CCNC: 11 U/L (ref 0–33)
ANION GAP SERPL CALCULATED.3IONS-SCNC: 16 MEQ/L (ref 9–15)
AST SERPL-CCNC: 17 U/L (ref 0–35)
BASOPHILS ABSOLUTE: 0 K/UL (ref 0–0.2)
BASOPHILS RELATIVE PERCENT: 0.5 %
BILIRUB SERPL-MCNC: 0.5 MG/DL (ref 0.2–0.7)
BUN BLDV-MCNC: 11 MG/DL (ref 6–20)
CALCIUM SERPL-MCNC: 9.8 MG/DL (ref 8.5–9.9)
CHLORIDE BLD-SCNC: 103 MEQ/L (ref 95–107)
CO2: 20 MEQ/L (ref 20–31)
CREAT SERPL-MCNC: 0.59 MG/DL (ref 0.5–0.9)
EOSINOPHILS ABSOLUTE: 0.1 K/UL (ref 0–0.7)
EOSINOPHILS RELATIVE PERCENT: 1.8 %
GFR AFRICAN AMERICAN: >60
GFR NON-AFRICAN AMERICAN: >60
GLOBULIN: 3.5 G/DL (ref 2.3–3.5)
GLUCOSE BLD-MCNC: 66 MG/DL (ref 70–99)
HCT VFR BLD CALC: 38.6 % (ref 37–47)
HEMOGLOBIN: 12.3 G/DL (ref 12–16)
LYMPHOCYTES ABSOLUTE: 1.8 K/UL (ref 1–4.8)
LYMPHOCYTES RELATIVE PERCENT: 36.5 %
MCH RBC QN AUTO: 25.7 PG (ref 27–31.3)
MCHC RBC AUTO-ENTMCNC: 32 % (ref 33–37)
MCV RBC AUTO: 80.4 FL (ref 82–100)
MONOCYTES ABSOLUTE: 0.3 K/UL (ref 0.2–0.8)
MONOCYTES RELATIVE PERCENT: 5.5 %
NEUTROPHILS ABSOLUTE: 2.7 K/UL (ref 1.4–6.5)
NEUTROPHILS RELATIVE PERCENT: 55.7 %
PDW BLD-RTO: 19.3 % (ref 11.5–14.5)
PLATELET # BLD: 361 K/UL (ref 130–400)
POTASSIUM SERPL-SCNC: 4.4 MEQ/L (ref 3.4–4.9)
RBC # BLD: 4.8 M/UL (ref 4.2–5.4)
SODIUM BLD-SCNC: 139 MEQ/L (ref 135–144)
TOTAL PROTEIN: 8.1 G/DL (ref 6.3–8)
TSH REFLEX: 0.84 UIU/ML (ref 0.44–3.86)
WBC # BLD: 4.9 K/UL (ref 4.8–10.8)

## 2021-06-22 PROCEDURE — G8419 CALC BMI OUT NRM PARAM NOF/U: HCPCS | Performed by: STUDENT IN AN ORGANIZED HEALTH CARE EDUCATION/TRAINING PROGRAM

## 2021-06-22 PROCEDURE — G8427 DOCREV CUR MEDS BY ELIG CLIN: HCPCS | Performed by: STUDENT IN AN ORGANIZED HEALTH CARE EDUCATION/TRAINING PROGRAM

## 2021-06-22 PROCEDURE — 4004F PT TOBACCO SCREEN RCVD TLK: CPT | Performed by: STUDENT IN AN ORGANIZED HEALTH CARE EDUCATION/TRAINING PROGRAM

## 2021-06-22 PROCEDURE — 99213 OFFICE O/P EST LOW 20 MIN: CPT | Performed by: STUDENT IN AN ORGANIZED HEALTH CARE EDUCATION/TRAINING PROGRAM

## 2021-06-22 SDOH — ECONOMIC STABILITY: FOOD INSECURITY: WITHIN THE PAST 12 MONTHS, YOU WORRIED THAT YOUR FOOD WOULD RUN OUT BEFORE YOU GOT MONEY TO BUY MORE.: NEVER TRUE

## 2021-06-22 SDOH — ECONOMIC STABILITY: FOOD INSECURITY: WITHIN THE PAST 12 MONTHS, THE FOOD YOU BOUGHT JUST DIDN'T LAST AND YOU DIDN'T HAVE MONEY TO GET MORE.: NEVER TRUE

## 2021-06-22 ASSESSMENT — PATIENT HEALTH QUESTIONNAIRE - PHQ9
2. FEELING DOWN, DEPRESSED OR HOPELESS: 0
SUM OF ALL RESPONSES TO PHQ QUESTIONS 1-9: 0
SUM OF ALL RESPONSES TO PHQ9 QUESTIONS 1 & 2: 0
1. LITTLE INTEREST OR PLEASURE IN DOING THINGS: 0
SUM OF ALL RESPONSES TO PHQ QUESTIONS 1-9: 0
SUM OF ALL RESPONSES TO PHQ QUESTIONS 1-9: 0

## 2021-06-22 ASSESSMENT — ENCOUNTER SYMPTOMS
SHORTNESS OF BREATH: 0
SINUS PRESSURE: 0
ABDOMINAL PAIN: 0
VOMITING: 0
COUGH: 0
SORE THROAT: 0

## 2021-06-22 ASSESSMENT — SOCIAL DETERMINANTS OF HEALTH (SDOH): HOW HARD IS IT FOR YOU TO PAY FOR THE VERY BASICS LIKE FOOD, HOUSING, MEDICAL CARE, AND HEATING?: NOT HARD AT ALL

## 2021-06-24 LAB — ANA IGG, ELISA: NORMAL

## 2021-06-25 LAB
ANA PATTERN: ABNORMAL
ANA TITER: ABNORMAL
ANTI JO-1 IGG: 5 AU/ML (ref 0–40)
ANTINUCLEAR AB INTERPRETIVE COMMENT: ABNORMAL
ANTINUCLEAR ANTIBODY, HEP-2, IGG: DETECTED
ENA TO SMITH (SM) ANTIBODY: 4 AU/ML (ref 0–40)
ENA TO SSB (LA) ANTIBODY: 0 AU/ML (ref 0–40)
SCLERODERMA (SCL-70) AB: 3 AU/ML (ref 0–40)
SSA 52 (RO) (ENA) AB, IGG: 2 AU/ML (ref 0–40)
SSA 60 (RO) (ENA) AB, IGG: 1 AU/ML (ref 0–40)

## 2021-06-26 LAB
DOUBLE STRANDED DNA AB, IGG: 17 IU (ref 0–24)
ENA TO RNP ANTIBODY: NORMAL

## 2021-07-12 ENCOUNTER — OFFICE VISIT (OUTPATIENT)
Dept: FAMILY MEDICINE CLINIC | Age: 26
End: 2021-07-12
Payer: COMMERCIAL

## 2021-07-12 VITALS
HEART RATE: 73 BPM | WEIGHT: 109 LBS | TEMPERATURE: 96.7 F | OXYGEN SATURATION: 99 % | HEIGHT: 67 IN | BODY MASS INDEX: 17.11 KG/M2

## 2021-07-12 DIAGNOSIS — L50.1 CHRONIC IDIOPATHIC URTICARIA: Primary | ICD-10-CM

## 2021-07-12 PROCEDURE — G8427 DOCREV CUR MEDS BY ELIG CLIN: HCPCS | Performed by: STUDENT IN AN ORGANIZED HEALTH CARE EDUCATION/TRAINING PROGRAM

## 2021-07-12 PROCEDURE — 4004F PT TOBACCO SCREEN RCVD TLK: CPT | Performed by: STUDENT IN AN ORGANIZED HEALTH CARE EDUCATION/TRAINING PROGRAM

## 2021-07-12 PROCEDURE — 99213 OFFICE O/P EST LOW 20 MIN: CPT | Performed by: STUDENT IN AN ORGANIZED HEALTH CARE EDUCATION/TRAINING PROGRAM

## 2021-07-12 PROCEDURE — G8419 CALC BMI OUT NRM PARAM NOF/U: HCPCS | Performed by: STUDENT IN AN ORGANIZED HEALTH CARE EDUCATION/TRAINING PROGRAM

## 2021-07-12 RX ORDER — FAMOTIDINE 20 MG/1
20 TABLET, FILM COATED ORAL 2 TIMES DAILY
Qty: 60 TABLET | Refills: 0 | Status: SHIPPED | OUTPATIENT
Start: 2021-07-12 | End: 2021-07-30 | Stop reason: ALTCHOICE

## 2021-07-12 RX ORDER — DIPHENHYDRAMINE HCL 25 MG
25 TABLET ORAL NIGHTLY
Qty: 30 TABLET | Refills: 0 | Status: SHIPPED | OUTPATIENT
Start: 2021-07-12 | End: 2021-07-30

## 2021-07-12 RX ORDER — MONTELUKAST SODIUM 10 MG/1
10 TABLET ORAL DAILY
Qty: 30 TABLET | Refills: 0 | Status: SHIPPED | OUTPATIENT
Start: 2021-07-12 | End: 2021-07-30 | Stop reason: ALTCHOICE

## 2021-07-12 RX ORDER — FEXOFENADINE HCL 180 MG/1
360 TABLET ORAL DAILY
Qty: 60 TABLET | Refills: 0 | Status: SHIPPED | OUTPATIENT
Start: 2021-07-12 | End: 2021-07-30 | Stop reason: ALTCHOICE

## 2021-07-12 ASSESSMENT — ENCOUNTER SYMPTOMS
VOMITING: 0
SINUS PRESSURE: 0
COUGH: 0
ABDOMINAL PAIN: 0
SHORTNESS OF BREATH: 0
SORE THROAT: 0

## 2021-07-12 NOTE — PROGRESS NOTES
2021    Tommy Campos (:  1995) is a 27 y.o. female, here for evaluation of the following medical concerns:  Chief Complaint   Patient presents with   [address]     here to discuss results     Other     Pt states she has been waking up shakie      HPI  Recurrent rash  Occurring off and on for 10 years  Seems to be getting worse over time  States she was told in the ER it might be lupus  Prior to the rash occurring she states to get itching and burning on her body  Rash when it occurs, is on her tongue, hands and palms     Oral steroids calm the rash down for a few days     Uses all fragrance free products, only uses dove sensitive skin  Zyrtec and benadryl do not help     Occasional joint pain  No known family history of autoimmune disease    Labwork done at last apt  MILENA positive speckled pattern however specific autoantibodies negative for SLE    Review of Systems   Constitutional: Negative for chills and fever. HENT: Negative for congestion, sinus pressure and sore throat. Respiratory: Negative for cough and shortness of breath. Cardiovascular: Negative for chest pain and palpitations. Gastrointestinal: Negative for abdominal pain and vomiting. Musculoskeletal: Negative for arthralgias and myalgias. Skin: Positive for rash. Negative for wound. Neurological: Negative for speech difficulty and light-headedness. Psychiatric/Behavioral: Negative for suicidal ideas. The patient is not nervous/anxious. Prior to Visit Medications    Medication Sig Taking?  Authorizing Provider   famotidine (PEPCID) 20 MG tablet Take 1 tablet by mouth 2 times daily Yes Bishop Snell DO   montelukast (SINGULAIR) 10 MG tablet Take 1 tablet by mouth daily Yes Bishop Snell DO   diphenhydrAMINE (BENADRYL) 25 MG tablet Take 1 tablet by mouth nightly Yes [place]orb[place]on Snell DO   fexofenadine (ALLEGRA) 180 MG tablet Take 2 tablets by mouth daily Yes Bishop Snell DO        No Known Allergies    Past Medical History:   Diagnosis Date    Gestational hypertension, third trimester      uterine contractions in third trimester, antepartum 10/5/2016       Past Surgical History:   Procedure Laterality Date     SECTION         Social History     Socioeconomic History    Marital status: Single     Spouse name: Not on file    Number of children: Not on file    Years of education: Not on file    Highest education level: Not on file   Occupational History    Not on file   Tobacco Use    Smoking status: Current Every Day Smoker     Types: Cigars    Smokeless tobacco: Never Used    Tobacco comment: black n milds   Vaping Use    Vaping Use: Never used   Substance and Sexual Activity    Alcohol use: Yes     Comment: occas    Drug use: No    Sexual activity: Yes     Partners: Male   Other Topics Concern    Not on file   Social History Narrative    Not on file     Social Determinants of Health     Financial Resource Strain: Low Risk     Difficulty of Paying Living Expenses: Not hard at all   Food Insecurity: No Food Insecurity    Worried About Running Out of Food in the Last Year: Never true    Alcira of Food in the Last Year: Never true   Transportation Needs:     Lack of Transportation (Medical):      Lack of Transportation (Non-Medical):    Physical Activity:     Days of Exercise per Week:     Minutes of Exercise per Session:    Stress:     Feeling of Stress :    Social Connections:     Frequency of Communication with Friends and Family:     Frequency of Social Gatherings with Friends and Family:     Attends [employer] Services:     Active Member of Clubs or Organizations:     Attends Club or Organization Meetings:     Marital Status:    Intimate Partner Violence:     Fear of Current or Ex-Partner:     Emotionally Abused:     Physically Abused:     Sexually Abused:         Family History   Problem Relation Age of Onset    Diabetes Father        Vitals: 07/12/21 0856   Pulse: 73   Temp: 96.7 °F (35.9 °C)   SpO2: 99%   Weight: 109 lb (49.4 kg)   Height: 5' 7\" (1.702 m)       Estimated body mass index is 17.07 kg/m² as calculated from the following:    Height as of this encounter: 5' 7\" (1.702 m). Weight as of this encounter: 109 lb (49.4 kg). No results for input(s): WBC, RBC, HGB, HCT, MCV, MCH, MCHC, RDW, PLT, MPV in the last 72 hours. No results for input(s): NA, K, CL, CO2, BUN, CREATININE, GLUCOSE, CALCIUM, PROT, LABALBU, BILITOT, ALKPHOS, AST, ALT in the last 72 hours. No results found for: LABA1C    No results found. Physical Exam  Constitutional:       Appearance: Normal appearance. She is normal weight. HENT:      Head: Normocephalic and atraumatic. Eyes:      Extraocular Movements: Extraocular movements intact. Conjunctiva/sclera: Conjunctivae normal.   Skin:     General: Skin is warm. Capillary Refill: Capillary refill takes less than 2 seconds. Findings: Rash (Slightly raised erythematous patches across the back and a few lesions on the abdomen) present. Neurological:      Mental Status: She is alert. Psychiatric:         Mood and Affect: Mood normal.         Thought Content: Thought content normal.         Judgment: Judgment normal.         ASSESSMENT/PLAN:  1. Chronic idiopathic urticaria  -Lab work with MILENA positive speckled pattern however follow-up autoantibodies negative for lupus or other autoimmune diseases  -Patient does have some residual rash on her back and does appear to be more hive-like in nature  -We will treat with Allegra, Pepcid Singulair and Benadryl to see if that suppresses the rash  -Patient to also keep a log over the next 2 weeks to see if there is any trigger for her rash  -Patient has not tolerated Zyrtec in the past so we will do Allegra instead    - famotidine (PEPCID) 20 MG tablet; Take 1 tablet by mouth 2 times daily  Dispense: 60 tablet;  Refill: 0  - montelukast (SINGULAIR) 10 MG tablet; Take 1 tablet by mouth daily  Dispense: 30 tablet; Refill: 0  - diphenhydrAMINE (BENADRYL) 25 MG tablet; Take 1 tablet by mouth nightly  Dispense: 30 tablet; Refill: 0  - fexofenadine (ALLEGRA) 180 MG tablet; Take 2 tablets by mouth daily  Dispense: 60 tablet; Refill: 0      ---------------------------------------------------------------------  Side effects, adverse effects of the medication prescribed today, as well as treatment plan/ rationale and result expectations have been discussed with the patient who expresses understanding and desires to proceed. Close follow up to evaluate treatment results and for coordination of care. I have reviewed the patient's medical history in detail and updated the computerized patient record. As always, patient is advised that if symptoms worsen in any way they will proceed to the nearest emergency room. --------------------------------------------------------------------    Return in about 2 weeks (around 7/26/2021) for Rash f/u Dr. Raysa Palma. An  electronic signature was used to authenticate this note.     --Randolph Alvarez DO on 7/12/2021 at 9:15 AM

## 2021-07-29 NOTE — PROGRESS NOTES
2021    Ana María Carlos (:  1995) is a 27 y.o. female, here for evaluation of the following medical concerns:  Chief Complaint   Patient presents with    2 Week Follow-Up     Not improving.  Rash     HPI  Recurrent rash  Occurring off and on for 10 years  Seems to be getting worse over time  States she was told in the ER it might be lupus  Prior to the rash occurring she states to get itching and burning on her body  Rash when it occurs, is on her tongue, hands and palms     Oral steroids calm the rash down for a few days     Uses all fragrance free products, only uses dove sensitive skin  Zyrtec and benadryl do not help     Occasional joint pain  No known family history of autoimmune disease     Labwork done  MILENA positive speckled pattern however specific autoantibodies negative for SLE      Rash appear to be more hive-like  Started on Allegra, Singulair, Benadryl and Pepcid to see if that resolves her symptoms    Follow-up today  No improvement with antihistamine regimen  Rash occurs every day to varying degree  Lesions last about a day then disappear  Last bad breakout was few weeks ago when she was in [address] worsening of rash with heat or stress    Review of Systems   Constitutional: Negative for chills and fever. HENT: Negative for congestion, sinus pressure and sore throat. Respiratory: Negative for cough and shortness of breath. Cardiovascular: Negative for chest pain and palpitations. Gastrointestinal: Negative for abdominal pain and vomiting. Musculoskeletal: Negative for arthralgias and myalgias. Skin: Positive for rash. Negative for wound. Neurological: Negative for speech difficulty and light-headedness. Psychiatric/Behavioral: Negative for suicidal ideas. The patient is not nervous/anxious.         Prior to Visit Medications    Not on File        No Known Allergies    Past Medical History:   Diagnosis Date    Gestational hypertension, third trimester      uterine contractions in third trimester, antepartum 10/5/2016       Past Surgical History:   Procedure Laterality Date     SECTION         Social History     Socioeconomic History    Marital status: Single     Spouse name: Not on file    Number of children: Not on file    Years of education: Not on file    Highest education level: Not on file   Occupational History    Not on file   Tobacco Use    Smoking status: Current Every Day Smoker     Types: Cigars    Smokeless tobacco: Never Used    Tobacco comment: black n milds   Vaping Use    Vaping Use: Never used   Substance and Sexual Activity    Alcohol use: Yes     Comment: occas    Drug use: No    Sexual activity: Yes     Partners: Male   Other Topics Concern    Not on file   Social History Narrative    Not on file     Social Determinants of Health     Financial Resource Strain: Low Risk     Difficulty of Paying Living Expenses: Not hard at all   Food Insecurity: No Food Insecurity    Worried About Running Out of Food in the Last Year: Never true    Alcira of Food in the Last Year: Never true   Transportation Needs:     Lack of Transportation (Medical):      Lack of Transportation (Non-Medical):    Physical Activity:     Days of Exercise per Week:     Minutes of Exercise per Session:    Stress:     Feeling of Stress :    Social Connections:     Frequency of Communication with Friends and Family:     Frequency of Social Gatherings with Friends and Family:     Attends [employer] Services:     Active Member of Clubs or Organizations:     Attends Club or Organization Meetings:     Marital Status:    Intimate Partner Violence:     Fear of Current or Ex-Partner:     Emotionally Abused:     Physically Abused:     Sexually Abused:         Family History   Problem Relation Age of Onset    Diabetes Father        Vitals:    21 1000   Pulse: 69   Temp: 97.2 °F (36.2 °C)   SpO2: 100%   Weight: 109 lb (49.4 kg) Height: 5' 7\" (1.702 m)       Estimated body mass index is 17.07 kg/m² as calculated from the following:    Height as of this encounter: 5' 7\" (1.702 m). Weight as of this encounter: 109 lb (49.4 kg). No results for input(s): WBC, RBC, HGB, HCT, MCV, MCH, MCHC, RDW, PLT, MPV in the last 72 hours. No results for input(s): NA, K, CL, CO2, BUN, CREATININE, GLUCOSE, CALCIUM, PROT, LABALBU, BILITOT, ALKPHOS, AST, ALT in the last 72 hours. No results found for: LABA1C    No results found. Physical Exam  Constitutional:       Appearance: Normal appearance. She is normal weight. HENT:      Head: Normocephalic and atraumatic. Eyes:      Extraocular Movements: Extraocular movements intact. Conjunctiva/sclera: Conjunctivae normal.   Cardiovascular:      Rate and Rhythm: Normal rate and regular rhythm. Pulses: Normal pulses. Heart sounds: Normal heart sounds. Pulmonary:      Effort: Pulmonary effort is normal.      Breath sounds: Normal breath sounds. No wheezing or rales. Skin:     General: Skin is warm. Capillary Refill: Capillary refill takes less than 2 seconds. Findings: No rash. Neurological:      Mental Status: She is alert. Psychiatric:         Mood and Affect: Mood normal.         Thought Content: Thought content normal.         Judgment: Judgment normal.         ASSESSMENT/PLAN:  1.  Rash  -Lab work with MILENA positive speckled pattern however follow-up autoantibodies negative for lupus or other autoimmune diseases  -Last appointment patient did have a few lesions on exam and appeared more hive-like, trialed antihistamine routine which did not change her rash  - At this point I think we do need to do a punch biopsy, patient will make same-day appointment when her rash is flared up      ---------------------------------------------------------------------  Side effects, adverse effects of the medication prescribed today, as well as treatment plan/ rationale and result expectations have been discussed with the patient who expresses understanding and desires to proceed. Close follow up to evaluate treatment results and for coordination of care. I have reviewed the patient's medical history in detail and updated the computerized patient record. As always, patient is advised that if symptoms worsen in any way they will proceed to the nearest emergency room. --------------------------------------------------------------------    Return in about 1 week (around 8/6/2021) for pt will call to schedule apt - 30 min punch biospy . An  electronic signature was used to authenticate this note.     --Chan Victoria, DO on 7/30/2021 at 10:29 AM

## 2021-07-30 ENCOUNTER — OFFICE VISIT (OUTPATIENT)
Dept: FAMILY MEDICINE CLINIC | Age: 26
End: 2021-07-30
Payer: COMMERCIAL

## 2021-07-30 VITALS
TEMPERATURE: 97.2 F | HEART RATE: 69 BPM | HEIGHT: 67 IN | BODY MASS INDEX: 17.11 KG/M2 | WEIGHT: 109 LBS | OXYGEN SATURATION: 100 %

## 2021-07-30 DIAGNOSIS — R21 RASH: Primary | ICD-10-CM

## 2021-07-30 PROCEDURE — G8419 CALC BMI OUT NRM PARAM NOF/U: HCPCS | Performed by: STUDENT IN AN ORGANIZED HEALTH CARE EDUCATION/TRAINING PROGRAM

## 2021-07-30 PROCEDURE — G8427 DOCREV CUR MEDS BY ELIG CLIN: HCPCS | Performed by: STUDENT IN AN ORGANIZED HEALTH CARE EDUCATION/TRAINING PROGRAM

## 2021-07-30 PROCEDURE — 4004F PT TOBACCO SCREEN RCVD TLK: CPT | Performed by: STUDENT IN AN ORGANIZED HEALTH CARE EDUCATION/TRAINING PROGRAM

## 2021-07-30 PROCEDURE — 99213 OFFICE O/P EST LOW 20 MIN: CPT | Performed by: STUDENT IN AN ORGANIZED HEALTH CARE EDUCATION/TRAINING PROGRAM

## 2021-07-30 ASSESSMENT — ENCOUNTER SYMPTOMS
ABDOMINAL PAIN: 0
SORE THROAT: 0
SINUS PRESSURE: 0
SHORTNESS OF BREATH: 0
COUGH: 0
VOMITING: 0

## 2021-08-03 DIAGNOSIS — L50.1 CHRONIC IDIOPATHIC URTICARIA: ICD-10-CM

## 2021-08-03 RX ORDER — FEXOFENADINE HCL 180 MG/1
TABLET ORAL
Qty: 60 TABLET | Refills: 0 | OUTPATIENT
Start: 2021-08-03

## 2021-08-03 RX ORDER — MONTELUKAST SODIUM 10 MG/1
TABLET ORAL
Qty: 30 TABLET | Refills: 0 | OUTPATIENT
Start: 2021-08-03

## 2021-08-06 ENCOUNTER — PROCEDURE VISIT (OUTPATIENT)
Dept: FAMILY MEDICINE CLINIC | Age: 26
End: 2021-08-06
Payer: COMMERCIAL

## 2021-08-06 VITALS
HEART RATE: 84 BPM | OXYGEN SATURATION: 99 % | WEIGHT: 109 LBS | TEMPERATURE: 97.6 F | BODY MASS INDEX: 17.11 KG/M2 | HEIGHT: 67 IN

## 2021-08-06 DIAGNOSIS — R21 RASH AND NONSPECIFIC SKIN ERUPTION: Primary | ICD-10-CM

## 2021-08-06 PROCEDURE — 11104 PUNCH BX SKIN SINGLE LESION: CPT | Performed by: STUDENT IN AN ORGANIZED HEALTH CARE EDUCATION/TRAINING PROGRAM

## 2021-08-06 NOTE — PROGRESS NOTES
HPI  Recurrent rash  Occurring off and on for 10 years  Seems to be getting worse over time  States she was told in the ER it might be lupus  Prior to the rash occurring she states to get itching and burning on her body  Rash when it occurs, is on her tongue, hands and palms     Oral steroids calm the rash down for a few days     Uses all fragrance free products, only uses dove sensitive skin  Zyrtec and benadryl do not help     Occasional joint pain  No known family history of autoimmune disease     Labwork done  MILENA positive speckled pattern however specific autoantibodies negative for SLE     7/12  Rash appear to be more hive-like  Started on Allegra, Singulair, Benadryl and Pepcid to see if that resolves her symptoms     7/30  No improvement with antihistamine regimen  Rash occurs every day to varying degree  Lesions last about a day then disappear  Last bad breakout was few weeks ago when she was in [address] worsening of rash with heat or stress    Plan for punch biopsy when rash returns    No current outpatient medications on file prior to visit. No current facility-administered medications on file prior to visit. Patient has no known allergies. Review of Systems   Constitutional: Negative for chills and fever. HENT: Negative for congestion, sinus pressure and sore throat. Respiratory: Negative for cough and shortness of breath. Cardiovascular: Negative for chest pain and palpitations. Gastrointestinal: Negative for abdominal pain and vomiting. Musculoskeletal: Negative for arthralgias and myalgias. Skin: Positive for rash. Negative for wound. Neurological: Negative for speech difficulty and light-headedness. Psychiatric/Behavioral: Negative for suicidal ideas. The patient is not nervous/anxious.       Pulse 84   Temp 97.6 °F (36.4 °C)   Ht 5' 7\" (1.702 m)   Wt 109 lb (49.4 kg)   SpO2 99%   BMI 17.07 kg/m²      Physical Exam  Physical Exam  Constitutional: Appearance: Normal appearance. She is normal weight. HENT:      Head: Normocephalic and atraumatic. Eyes:      Extraocular Movements: Extraocular movements intact. Conjunctiva/sclera: Conjunctivae normal.   Cardiovascular:      Rate and Rhythm: Normal rate and regular rhythm. Pulses: Normal pulses. Heart sounds: Normal heart sounds. Pulmonary:      Effort: Pulmonary effort is normal.      Breath sounds: Normal breath sounds. No wheezing or rales. Skin:     General: Skin is warm. Capillary Refill: Capillary refill takes less than 2 seconds. Findings: Erythematous raised patches with some central clearing to lesions on the lower extremities, chest and back as seen in picture below  Neurological:      Mental Status: She is alert. Psychiatric:         Mood and Affect: Mood normal.         Thought Content: Thought content normal.         Judgment: Judgment normal.           Informed consent was given by the patient. Risks including infection, bleeding and scarring were discussed. No guarantee how the scar will look. Punch Biopsy:   Indication: uncertain nature of rash  Consent: All risks, benefits, and potential complications were discussed including bleeding, infection, and scarring. It was explained that this procedure was for diagnostic purposes and was not being performed with the intention of a curing their condition. Location 1: Left mid thigh    Method: Site cleansed with alcohol, injected with 1% lidocaine with epinephrine and then the punch instrument (3 mm), forceps, and pointed scissors were used to procure a representative specimen. The defect was then closed usin-0 prolene. Number of sutures: 1     Post-OP: Pressure bandage was applied and is to remain in place for 24 hours. Thereafter, the patient is instructed to cleanse the wound once daily with soap and water, apply Vaseline and a bandage for one week.  The patient is instructed to notify the office if the wound site ooze, become painful or red. The biopsy specimen was sent to the laboratory for pathological evaluation. We will call the patient in seven to fourteen days with the biopsy results. Post op wound care instructions were given to the patient. He/She will f/u in 2 weeks or sooner if needed for problems. Rachna Pearce was seen today for procedure. Diagnoses and all orders for this visit:    Rash and nonspecific skin eruption        Return in about 10 days (around 8/16/2021) for f/u punch bx Dr. Catalino Camarena. DO NOT USE TRIPLE ANTIBIOTIC ON THE WOUND. Patient Instructions   Incision/ Shave biopsy/ Laceration repair    - Wash your hands with soap and water before cleaning the surgical area, this is the most common cause of infection  - Clean the surgical area with antibacterial soap and water once daily  - Keep surgical site moist with Vaseline and apply a fresh bandage daily until a solid scab forms or if the wound is at risk for trauma or dirt. - Follow up immediately if any growing redness (minimal redness or pale pink is normal along wound edges) surrounds the surgical site or if drainage occurs at the surgical site. - Once a solid scab forms the bandage is no longer needed. A wet scab can look yellow. This is not infection, just moisture. - Once the lesion is healed be sure to apply sunscreen to the area to prevent burn of the new and more delicate skin during the first 6 months of healing.    - If the scar begins to be raised you may massage the area firmly twice a day to help break down scar tissue and help the area become a flat scar.  There is some evidence that Mederma applied to a scar daily for the first few months can help shrink and fade it more quickly then without intervention.           - Clean surgical area with antibacterial soap and water once daily.    - Keep the surgical site moist with vaseline and apply a fresh bandage daily until a solid scab forms or if the wound is at risk for trauma or dirt. - Follow up immediately if any growing redness (minimal redness or pale pink is normal along wound edges) surrounds the surgical site or if drainage occurs at surgical site. Once a solid scab forms a bandage is no longer needed. - Once the lesion is healed, be sure to apply sunscreen to the area to prevent burn of the newer and more delicate skin during the first 6 months of healing.    - If the scar begins to be raised you may massage the area firmly twice a day to help break down scar tissue and help the area become a flat scar. There is some evidence that Mederma applied to a scar daily for the first few months can help shrink and fade it more quickly then without intervention. Skin Lesion Removal: What to Expect at Home  Your Recovery  After your procedure, you should not have much pain. But some soreness, swelling, or bruising is normal. Your doctor may recommend over-the-counter medicines to help with any discomfort. Most people can return to their normal routine the same day of their procedure. How quickly your wound heals depends on the size of your wound and the type of procedure you had. Most wounds take 1 to 3 weeks to heal. If you had laser surgery, your skin may change color and then slowly return to its normal color. You may need only a bandage, or you may need stitches. If you had stitches, your doctor will probably remove them 5 to 14 days later. If you have the type of stitches that dissolve, they do not have to be removed. They will disappear on their own. This care sheet gives you a general idea about how long it will take for you to recover. But each person recovers at a different pace. Follow the steps below to get better as quickly as possible. How can you care for yourself at home? Activity    · For the first few days, try not to bump or knock your wound.      · Depending on where your wound is, you may need to avoid strenuous exercise for 2 weeks after the up on its own. It is common for the blister to fill with blood. You do not need to do anything about this, but if it becomes too painful, call your doctor. · Avoid the sun until your stitches are removed. Follow-up care is a key part of your treatment and safety. Be sure to make and go to all appointments, and call your doctor if you are having problems. It's also a good idea to know your test results and keep a list of the medicines you take. When should you call for help? Call 911 anytime you think you may need emergency care. For example, call if:    · You passed out (lost consciousness). · You have severe trouble breathing. · You have sudden chest pain and shortness of breath, or you cough up blood. Call your doctor now or seek immediate medical care if:    · You have symptoms of a blood clot in your leg (called a deep vein thrombosis), such as:  ? Pain in the calf, back of the knee, thigh, or groin. ? Redness and swelling in your leg or groin. · You have signs of infection, such as:  ? Increased pain, swelling, warmth, or redness. ? Red streaks leading from the wound. ? Pus draining from the wound. ? A fever. · You have pain that does not get better after you take pain medicine. · You have loose stitches. Watch closely for changes in your health, and be sure to contact your doctor if you have any problems.

## 2021-08-06 NOTE — PROGRESS NOTES
A timeout was performed immediately prior to the start of the Punch Biopsy procedure and included the correct patient (two identifiers), correct procedure and correct site(s). Procedure consent and allergies were also verified.

## 2021-08-18 ENCOUNTER — TELEPHONE (OUTPATIENT)
Dept: FAMILY MEDICINE CLINIC | Age: 26
End: 2021-08-18

## 2021-08-18 NOTE — TELEPHONE ENCOUNTER
Pt called asking to come in to see Dr Victoriano Dent earlier then Friday. Pt states that she is struggling with rash outbreak and does not want to wait. Aware that we have [facility] available but there is nothing more that they can probably do for her since Victoriano Dent is treating.

## 2021-08-19 ENCOUNTER — HOSPITAL ENCOUNTER (EMERGENCY)
Age: 26
Discharge: HOME | End: 2021-08-19
Payer: COMMERCIAL

## 2021-08-19 VITALS
SYSTOLIC BLOOD PRESSURE: 126 MMHG | HEIGHT: 67 IN | RESPIRATION RATE: 16 BRPM | WEIGHT: 113 LBS | HEART RATE: 88 BPM | OXYGEN SATURATION: 99 % | TEMPERATURE: 97.5 F | BODY MASS INDEX: 17.74 KG/M2 | DIASTOLIC BLOOD PRESSURE: 83 MMHG

## 2021-08-19 DIAGNOSIS — R17 ELEVATED BILIRUBIN: ICD-10-CM

## 2021-08-19 DIAGNOSIS — L50.9 URTICARIA: Primary | ICD-10-CM

## 2021-08-19 DIAGNOSIS — R51.9 ACUTE NONINTRACTABLE HEADACHE, UNSPECIFIED HEADACHE TYPE: ICD-10-CM

## 2021-08-19 LAB
ALBUMIN SERPL-MCNC: 4.2 G/DL (ref 3.5–4.6)
ALP BLD-CCNC: 99 U/L (ref 40–130)
ALT SERPL-CCNC: 13 U/L (ref 0–33)
ANION GAP SERPL CALCULATED.3IONS-SCNC: 14 MEQ/L (ref 9–15)
AST SERPL-CCNC: 16 U/L (ref 0–35)
BASOPHILS ABSOLUTE: 0 K/UL (ref 0–0.2)
BASOPHILS RELATIVE PERCENT: 0.3 %
BILIRUB SERPL-MCNC: 0.9 MG/DL (ref 0.2–0.7)
BUN BLDV-MCNC: 8 MG/DL (ref 6–20)
CALCIUM SERPL-MCNC: 8.9 MG/DL (ref 8.5–9.9)
CHLORIDE BLD-SCNC: 104 MEQ/L (ref 95–107)
CO2: 18 MEQ/L (ref 20–31)
CREAT SERPL-MCNC: 0.41 MG/DL (ref 0.5–0.9)
EOSINOPHILS ABSOLUTE: 0.1 K/UL (ref 0–0.7)
EOSINOPHILS RELATIVE PERCENT: 0.8 %
GFR AFRICAN AMERICAN: >60
GFR NON-AFRICAN AMERICAN: >60
GLOBULIN: 3.1 G/DL (ref 2.3–3.5)
GLUCOSE BLD-MCNC: 83 MG/DL (ref 70–99)
HCT VFR BLD CALC: 38.5 % (ref 37–47)
HEMOGLOBIN: 12.4 G/DL (ref 12–16)
LYMPHOCYTES ABSOLUTE: 1.5 K/UL (ref 1–4.8)
LYMPHOCYTES RELATIVE PERCENT: 20.8 %
MAGNESIUM: 1.8 MG/DL (ref 1.7–2.4)
MCH RBC QN AUTO: 25.4 PG (ref 27–31.3)
MCHC RBC AUTO-ENTMCNC: 32.3 % (ref 33–37)
MCV RBC AUTO: 78.8 FL (ref 82–100)
MONOCYTES ABSOLUTE: 0.3 K/UL (ref 0.2–0.8)
MONOCYTES RELATIVE PERCENT: 3.6 %
NEUTROPHILS ABSOLUTE: 5.3 K/UL (ref 1.4–6.5)
NEUTROPHILS RELATIVE PERCENT: 74.5 %
PDW BLD-RTO: 15.9 % (ref 11.5–14.5)
PLATELET # BLD: 389 K/UL (ref 130–400)
POTASSIUM SERPL-SCNC: 3.9 MEQ/L (ref 3.4–4.9)
RBC # BLD: 4.89 M/UL (ref 4.2–5.4)
SODIUM BLD-SCNC: 136 MEQ/L (ref 135–144)
TOTAL PROTEIN: 7.3 G/DL (ref 6.3–8)
WBC # BLD: 7.1 K/UL (ref 4.8–10.8)

## 2021-08-19 PROCEDURE — 96374 THER/PROPH/DIAG INJ IV PUSH: CPT

## 2021-08-19 PROCEDURE — 2580000003 HC RX 258: Performed by: PHYSICIAN ASSISTANT

## 2021-08-19 PROCEDURE — 83735 ASSAY OF MAGNESIUM: CPT

## 2021-08-19 PROCEDURE — 96375 TX/PRO/DX INJ NEW DRUG ADDON: CPT

## 2021-08-19 PROCEDURE — 80053 COMPREHEN METABOLIC PANEL: CPT

## 2021-08-19 PROCEDURE — 99285 EMERGENCY DEPT VISIT HI MDM: CPT

## 2021-08-19 PROCEDURE — 36415 COLL VENOUS BLD VENIPUNCTURE: CPT

## 2021-08-19 PROCEDURE — 85025 COMPLETE CBC W/AUTO DIFF WBC: CPT

## 2021-08-19 PROCEDURE — 6370000000 HC RX 637 (ALT 250 FOR IP): Performed by: PHYSICIAN ASSISTANT

## 2021-08-19 PROCEDURE — 6360000002 HC RX W HCPCS: Performed by: PHYSICIAN ASSISTANT

## 2021-08-19 RX ORDER — DIPHENHYDRAMINE HYDROCHLORIDE 50 MG/ML
50 INJECTION INTRAMUSCULAR; INTRAVENOUS ONCE
Status: COMPLETED | OUTPATIENT
Start: 2021-08-19 | End: 2021-08-19

## 2021-08-19 RX ORDER — HYDROXYZINE PAMOATE 25 MG/1
25-50 CAPSULE ORAL 3 TIMES DAILY PRN
Qty: 30 CAPSULE | Refills: 0 | Status: SHIPPED | OUTPATIENT
Start: 2021-08-19 | End: 2021-09-02

## 2021-08-19 RX ORDER — PREDNISONE 10 MG/1
TABLET ORAL
Qty: 30 TABLET | Refills: 0 | Status: SHIPPED | OUTPATIENT
Start: 2021-08-19 | End: 2021-08-29

## 2021-08-19 RX ORDER — 0.9 % SODIUM CHLORIDE 0.9 %
1000 INTRAVENOUS SOLUTION INTRAVENOUS ONCE
Status: COMPLETED | OUTPATIENT
Start: 2021-08-19 | End: 2021-08-19

## 2021-08-19 RX ORDER — FAMOTIDINE 20 MG/1
20 TABLET, FILM COATED ORAL ONCE
Status: COMPLETED | OUTPATIENT
Start: 2021-08-19 | End: 2021-08-19

## 2021-08-19 RX ORDER — METHYLPREDNISOLONE SODIUM SUCCINATE 125 MG/2ML
125 INJECTION, POWDER, LYOPHILIZED, FOR SOLUTION INTRAMUSCULAR; INTRAVENOUS ONCE
Status: COMPLETED | OUTPATIENT
Start: 2021-08-19 | End: 2021-08-19

## 2021-08-19 RX ADMIN — METHYLPREDNISOLONE SODIUM SUCCINATE 125 MG: 125 INJECTION, POWDER, FOR SOLUTION INTRAMUSCULAR; INTRAVENOUS at 16:08

## 2021-08-19 RX ADMIN — DIPHENHYDRAMINE HYDROCHLORIDE 50 MG: 50 INJECTION, SOLUTION INTRAMUSCULAR; INTRAVENOUS at 16:08

## 2021-08-19 RX ADMIN — SODIUM CHLORIDE 1000 ML: 9 INJECTION, SOLUTION INTRAVENOUS at 16:08

## 2021-08-19 RX ADMIN — FAMOTIDINE 20 MG: 20 TABLET, FILM COATED ORAL at 16:08

## 2021-08-19 ASSESSMENT — PAIN DESCRIPTION - PAIN TYPE: TYPE: ACUTE PAIN

## 2021-08-19 ASSESSMENT — ENCOUNTER SYMPTOMS
VOICE CHANGE: 0
APNEA: 0
COUGH: 0
EYE DISCHARGE: 0
NAUSEA: 0
BACK PAIN: 0
SHORTNESS OF BREATH: 0
ABDOMINAL DISTENTION: 0
ANAL BLEEDING: 0
PHOTOPHOBIA: 0
VOMITING: 0

## 2021-08-19 ASSESSMENT — PAIN DESCRIPTION - ORIENTATION: ORIENTATION: LEFT;RIGHT

## 2021-08-19 ASSESSMENT — PAIN SCALES - GENERAL: PAINLEVEL_OUTOF10: 9

## 2021-08-19 ASSESSMENT — PAIN DESCRIPTION - DESCRIPTORS: DESCRIPTORS: CONSTANT;POUNDING

## 2021-08-19 ASSESSMENT — PAIN DESCRIPTION - LOCATION: LOCATION: HEAD

## 2021-08-19 NOTE — ED PROVIDER NOTES
[address] [facility] ED  eMERGENCY dEPARTMENT eNCOUnter      Pt Name: Bc Francis  MRN: [id number]  [place] 1995  Date of evaluation: 8/19/2021  Provider: Indigo Lawton PA-C    CHIEF COMPLAINT       Chief Complaint   Patient presents with    Migraine     hives          HISTORY OF PRESENT ILLNESS   (Location/Symptom, Timing/Onset,Context/Setting, Quality, Duration, Modifying Factors, Severity)  Note limiting factors. Bc Francis is a 27 y.o. female who presents to the emergency department patient presents with hives she has a headache notes that she has had issues with hypersensitivity for over 12 years no known cause she has been evaluated by primary care including blood work without results patient states she is taken Benadryl and Allegra without relief. Patient denies fever chills nausea vomiting. Has no diagnosis of migraine. Symptoms moderate severity nothing improves or worsens her symptoms    HPI    NursingNotes were reviewed. REVIEW OF SYSTEMS    (2-9 systems for level 4, 10 or more for level 5)     Review of Systems   Constitutional: Negative for activity change, appetite change, fever and unexpected weight change. HENT: Negative for ear discharge, nosebleeds and voice change. Eyes: Negative for photophobia and discharge. Respiratory: Negative for apnea, cough and shortness of breath. Cardiovascular: Negative for chest pain. Gastrointestinal: Negative for abdominal distention, anal bleeding, nausea and vomiting. Endocrine: Negative for cold intolerance, heat intolerance and polyphagia. Genitourinary: Negative for genital sores. Musculoskeletal: Negative for back pain, joint swelling, neck pain and neck stiffness. Skin: Positive for rash. Negative for pallor. Allergic/Immunologic: Negative for immunocompromised state. Neurological: Positive for headaches. Negative for dizziness, seizures, facial asymmetry and weakness.    Hematological: Does not bruise/bleed easily. Psychiatric/Behavioral: Negative for behavioral problems, self-injury and sleep disturbance. All other systems reviewed and are negative. Except as noted above the remainder of the review of systems was reviewed and negative. PAST MEDICAL HISTORY     Past Medical History:   Diagnosis Date    Gestational hypertension, third trimester      uterine contractions in third trimester, antepartum 10/5/2016         SURGICALHISTORY       Past Surgical History:   Procedure Laterality Date     SECTION           CURRENT MEDICATIONS       Previous Medications    No medications on file       ALLERGIES     Patient has no known allergies. FAMILY HISTORY       Family History   Problem Relation Age of Onset    Diabetes Father           SOCIAL HISTORY       Social History     Socioeconomic History    Marital status: Single     Spouse name: None    Number of children: None    Years of education: None    Highest education level: None   Occupational History    None   Tobacco Use    Smoking status: Current Every Day Smoker     Types: Cigars    Smokeless tobacco: Never Used    Tobacco comment: black n milds   Vaping Use    Vaping Use: Never used   Substance and Sexual Activity    Alcohol use: Yes     Comment: occas    Drug use: No    Sexual activity: Yes     Partners: Male   Other Topics Concern    None   Social History Narrative    None     Social Determinants of Health     Financial Resource Strain: Low Risk     Difficulty of Paying Living Expenses: Not hard at all   Food Insecurity: No Food Insecurity    Worried About Running Out of Food in the Last Year: Never true    Alcira of Food in the Last Year: Never true   Transportation Needs:     Lack of Transportation (Medical):      Lack of Transportation (Non-Medical):    Physical Activity:     Days of Exercise per Week:     Minutes of Exercise per Session:    Stress:     Feeling of Stress :    Social Connections:     Frequency of Communication with Friends and Family:     Frequency of Social Gatherings with Friends and Family:     Attends Bahai Services:     Active Member of Clubs or Organizations:     Attends Club or Organization Meetings:     Marital Status:    Intimate Partner Violence:     Fear of Current or Ex-Partner:     Emotionally Abused:     Physically Abused:     Sexually Abused:        SCREENINGS    Sharon Coma Scale  Eye Opening: Spontaneous  Best Verbal Response: Oriented  Best Motor Response: Obeys commands  Sylvia Coma Scale Score: 15 @FLOW([id number])@      PHYSICAL EXAM    (up to 7 for level 4, 8 or more for level 5)     ED Triage Vitals [08/19/21 1523]   BP Temp Temp Source Pulse Resp SpO2 Height Weight   (!) 135/90 97.5 °F (36.4 °C) Oral 103 16 100 % 5' 7\" (1.702 m) 113 lb (51.3 kg)       Physical Exam  Vitals and nursing note reviewed. Constitutional:       General: She is not in acute distress. Appearance: She is well-developed. HENT:      Head: Normocephalic and atraumatic. Right Ear: Tympanic membrane and external ear normal.      Left Ear: Tympanic membrane and external ear normal.      Nose: Nose normal.      Mouth/Throat:      Mouth: Mucous membranes are moist.      Pharynx: Oropharynx is clear. Eyes:      General:         Right eye: No discharge. Left eye: No discharge. Extraocular Movements: Extraocular movements intact. Pupils: Pupils are equal, round, and reactive to light. Cardiovascular:      Rate and Rhythm: Normal rate and regular rhythm. Pulses: Normal pulses. Heart sounds: Normal heart sounds. Pulmonary:      Effort: Pulmonary effort is normal. No respiratory distress. Breath sounds: Normal breath sounds. No stridor. Abdominal:      General: Bowel sounds are normal. There is no distension. Palpations: Abdomen is soft. Tenderness: There is no abdominal tenderness.  There is no right CVA tenderness or left CVA tenderness. Musculoskeletal:         General: Normal range of motion. Cervical back: Normal range of motion and neck supple. Skin:     General: Skin is warm. Capillary Refill: Capillary refill takes less than 2 seconds. Findings: Rash present. No erythema. Comments: Patient has diffuse urticaria which is consistent with her prior episodes. Neurological:      Mental Status: She is alert and oriented to person, place, and time. Psychiatric:         Mood and Affect: Mood normal.         DIAGNOSTIC RESULTS     EKG: All EKG's are interpreted by the Emergency Department Physician who either signs or Co-signsthis chart in the absence of a cardiologist.         RADIOLOGY:   Joylene Hallmark such as CT, Ultrasound and MRI are read by the radiologist. Plain radiographic images are visualized and preliminarily interpreted by the emergency physician with the below findings:         Interpretation per the Radiologist below, if available at the time ofthis note:    No orders to display         ED BEDSIDE ULTRASOUND:   Performed by ED Physician - none    LABS:  Labs Reviewed   COMPREHENSIVE METABOLIC PANEL - Abnormal; Notable for the following components:       Result Value    CO2 18 (*)     CREATININE 0.41 (*)     Total Bilirubin 0.9 (*)     All other components within normal limits   CBC WITH AUTO DIFFERENTIAL - Abnormal; Notable for the following components:    MCV 78.8 (*)     MCH 25.4 (*)     MCHC 32.3 (*)     RDW 15.9 (*)     All other components within normal limits   MAGNESIUM       All other labs were within normal range or not returned as of this dictation.     EMERGENCY DEPARTMENT COURSE and DIFFERENTIAL DIAGNOSIS/MDM:   Vitals:    Vitals:    08/19/21 1523   BP: (!) 135/90   Pulse: 103   Resp: 16   Temp: 97.5 °F (36.4 °C)   TempSrc: Oral   SpO2: 100%   Weight: 113 lb (51.3 kg)   Height: 5' 7\" (1.702 m)            MDM  Number of Diagnoses or Management Options  Diagnosis management comments: States the dermatitis is typical for her flares. She was tested for lupus. Primary care took skin biopsy. She does know the results yet. Patient states is not the worst of her life she still has full range of motion of her head and neck. Will disposition home her headache has improved with medications and fluids patient noted she had had to eat or drink prior to arrival.  Patient to follow-up with primary care as well as dermatology and allergy. Amount and/or Complexity of Data Reviewed  Clinical lab tests: reviewed and ordered        CRITICAL CARE TIME     CONSULTS:  None    PROCEDURES:  Unless otherwise noted below, none     Procedures    FINAL IMPRESSION      1. Urticaria    2. Elevated bilirubin    3.  Acute nonintractable headache, unspecified headache type          DISPOSITION/PLAN   DISPOSITION Decision To Discharge 08/19/2021 05:45:18 PM      PATIENT REFERRED TO:  Jesús Fraser MD  [address]  [id number]  [facility] [id number] [phone]    Schedule an appointment as soon as possible for a visit in 1 day      Your Primary care doctor    Call in 1 day      [facility]) ED  [address] [facility]  [phone]  Go to   As needed      DISCHARGE MEDICATIONS:  New Prescriptions    HYDROXYZINE (VISTARIL) 25 MG CAPSULE    Take 1-2 capsules by mouth 3 times daily as needed for Itching    PREDNISONE (DELTASONE) 10 MG TABLET    5 tabs po qam for 2 days then 4,3,2,1 tabs qam for 2 days each total of 10 days          (Please note that portions of this note were completed with a voice recognition program.  Efforts were made to edit the dictations but occasionally words are mis-transcribed.)    Huey Weaver PA-C (electronically signed)  Attending Emergency Physician       Huey Weaver PA-C  08/19/21 [id number] [facility]

## 2021-08-19 NOTE — ED TRIAGE NOTES
Pt c/o head pain x2 days, urticaria hroughout body. Pt is A&Ox4, skin intact, msps intact, afebrile, breaths are equal and unlabored.

## 2021-08-20 ENCOUNTER — OFFICE VISIT (OUTPATIENT)
Dept: FAMILY MEDICINE CLINIC | Age: 26
End: 2021-08-20
Payer: COMMERCIAL

## 2021-08-20 VITALS — OXYGEN SATURATION: 99 % | TEMPERATURE: 96.9 F | HEART RATE: 88 BPM

## 2021-08-20 DIAGNOSIS — Z09 HOSPITAL DISCHARGE FOLLOW-UP: ICD-10-CM

## 2021-08-20 DIAGNOSIS — L23.9 DERMAL HYPERSENSITIVITY REACTION: Primary | ICD-10-CM

## 2021-08-20 PROCEDURE — 99213 OFFICE O/P EST LOW 20 MIN: CPT | Performed by: STUDENT IN AN ORGANIZED HEALTH CARE EDUCATION/TRAINING PROGRAM

## 2021-08-20 PROCEDURE — G8419 CALC BMI OUT NRM PARAM NOF/U: HCPCS | Performed by: STUDENT IN AN ORGANIZED HEALTH CARE EDUCATION/TRAINING PROGRAM

## 2021-08-20 PROCEDURE — G8427 DOCREV CUR MEDS BY ELIG CLIN: HCPCS | Performed by: STUDENT IN AN ORGANIZED HEALTH CARE EDUCATION/TRAINING PROGRAM

## 2021-08-20 PROCEDURE — 99024 POSTOP FOLLOW-UP VISIT: CPT | Performed by: STUDENT IN AN ORGANIZED HEALTH CARE EDUCATION/TRAINING PROGRAM

## 2021-08-20 PROCEDURE — 4004F PT TOBACCO SCREEN RCVD TLK: CPT | Performed by: STUDENT IN AN ORGANIZED HEALTH CARE EDUCATION/TRAINING PROGRAM

## 2021-08-20 ASSESSMENT — ENCOUNTER SYMPTOMS
SORE THROAT: 0
SINUS PRESSURE: 0
SHORTNESS OF BREATH: 0
VOMITING: 0
COUGH: 0
ABDOMINAL PAIN: 0

## 2021-08-20 NOTE — PROGRESS NOTES
2021    Bernard Gray (:  1995) is a 27 y.o. female, here for evaluation of the following medical concerns:  Chief Complaint   Patient presents with    Results     HPI  Recurrent rash  Occurring off and on for 10 years  Seems to be getting worse over time  States she was told in the ER it might be lupus  Prior to the rash occurring she states to get itching and burning on her body  Rash when it occurs, is on her tongue, hands and palms     Oral steroids calm the rash down for a few days     Uses all fragrance free products, only uses dove sensitive skin  Zyrtec and benadryl do not help     Occasional joint pain  No known family history of autoimmune disease     Labwork done  MILENA positive speckled pattern however specific autoantibodies negative for SLE       Rash appear to be more hive-like  Started on Allegra, Singulair, Benadryl and Pepcid to see if that resolves her symptoms       No improvement with antihistamine regimen  Rash occurs every day to varying degree  Lesions last about a day then disappear  Last bad breakout was few weeks ago when she was in [address] worsening of rash with heat or stress    Punch biopsy positive for dermal hypersensitivity reaction    ER follow up  Seen in the ER yesterday due to rash and headache  Given IV prednisone and Benadryl in the ER followed by prednisone taper and predispose taper and vistaril   States rash resolved overnight    Review of Systems   Constitutional: Negative for chills and fever. HENT: Negative for congestion, sinus pressure and sore throat. Respiratory: Negative for cough and shortness of breath. Cardiovascular: Negative for chest pain and palpitations. Gastrointestinal: Negative for abdominal pain and vomiting. Musculoskeletal: Negative for arthralgias and myalgias. Skin: Positive for rash. Negative for wound. Neurological: Negative for speech difficulty and light-headedness.    Psychiatric/Behavioral: of Communication with Friends and Family:     Frequency of Social Gatherings with Friends and Family:     Attends Yarsanism Services:     Active Member of Clubs or Organizations:     Attends Club or Organization Meetings:     Marital Status:    Intimate Partner Violence:     Fear of Current or Ex-Partner:     Emotionally Abused:     Physically Abused:     Sexually Abused:         Family History   Problem Relation Age of Onset    Diabetes Father        Vitals:    08/20/21 1218   Pulse: 88   Temp: 96.9 °F (36.1 °C)   SpO2: 99%       Estimated body mass index is 17.7 kg/m² as calculated from the following:    Height as of 8/19/21: 5' 7\" (1.702 m). Weight as of 8/19/21: 113 lb (51.3 kg). Recent Labs     08/19/21  1600   WBC 7.1   RBC 4.89   HGB 12.4   HCT 38.5   MCV 78.8*   MCH 25.4*   MCHC 32.3*   RDW 15.9*          Recent Labs     08/19/21  1600      K 3.9      CO2 18*   BUN 8   CREATININE 0.41*   GLUCOSE 83   CALCIUM 8.9   PROT 7.3   LABALBU 4.2   BILITOT 0.9*   ALKPHOS 99   AST 16   ALT 13       No results found for: LABA1C    No results found. Physical Exam  Constitutional:       Appearance: Normal appearance. She is normal weight. HENT:      Head: Normocephalic and atraumatic. Eyes:      Extraocular Movements: Extraocular movements intact. Conjunctiva/sclera: Conjunctivae normal.   Cardiovascular:      Rate and Rhythm: Normal rate and regular rhythm. Pulses: Normal pulses. Heart sounds: Normal heart sounds. Pulmonary:      Effort: Pulmonary effort is normal.      Breath sounds: Normal breath sounds. No wheezing or rales. Skin:     General: Skin is warm. Capillary Refill: Capillary refill takes less than 2 seconds. Findings: No rash. Neurological:      Mental Status: She is alert. Psychiatric:         Mood and Affect: Mood normal.         Thought Content: Thought content normal.         Judgment: Judgment normal.       ASSESSMENT/PLAN:  1. Dermal hypersensitivity reaction  -Punch biopsy positive for dermal hypersensitivity reaction  -She has been on topical steroids, oral antihistamines without relief in symptoms  -Usually she gets a few days of symptom relief with high-dose steroids  -I would like to her to get patch testing with allergy at this time to make sure there is no allergic component  -Otherwise recommend follow-up with dermatology to see if there are any other recommendations besides doing long-term steroids    - External Referral to Allergy  - [facility] - Rufina Gerardo MD, [facility], [facility]    2. Hospital discharge follow-up      ---------------------------------------------------------------------  Side effects, adverse effects of the medication prescribed today, as well as treatment plan/ rationale and result expectations have been discussed with the patient who expresses understanding and desires to proceed. Close follow up to evaluate treatment results and for coordination of care. I have reviewed the patient's medical history in detail and updated the computerized patient record. As always, patient is advised that if symptoms worsen in any way they will proceed to the nearest emergency room. --------------------------------------------------------------------    Return if symptoms worsen or fail to improve. An  electronic signature was used to authenticate this note.     --Erfain Mcgrath DO on 8/20/2021 at 12:35 PM

## 2021-08-29 ENCOUNTER — HOSPITAL ENCOUNTER (EMERGENCY)
Age: 26
Discharge: HOME | End: 2021-08-29
Attending: STUDENT IN AN ORGANIZED HEALTH CARE EDUCATION/TRAINING PROGRAM
Payer: COMMERCIAL

## 2021-08-29 VITALS
WEIGHT: 113 LBS | SYSTOLIC BLOOD PRESSURE: 105 MMHG | TEMPERATURE: 98.7 F | HEART RATE: 79 BPM | BODY MASS INDEX: 17.74 KG/M2 | OXYGEN SATURATION: 100 % | HEIGHT: 67 IN | DIASTOLIC BLOOD PRESSURE: 61 MMHG | RESPIRATION RATE: 20 BRPM

## 2021-08-29 DIAGNOSIS — R21 RASH AND OTHER NONSPECIFIC SKIN ERUPTION: Primary | ICD-10-CM

## 2021-08-29 LAB
HCG, URINE, POC: NEGATIVE
Lab: NORMAL
NEGATIVE QC PASS/FAIL: NORMAL
POSITIVE QC PASS/FAIL: NORMAL

## 2021-08-29 PROCEDURE — 99283 EMERGENCY DEPT VISIT LOW MDM: CPT

## 2021-08-29 RX ORDER — CLOTRIMAZOLE 1 %
CREAM (GRAM) TOPICAL
Qty: 1 TUBE | Refills: 0 | Status: SHIPPED | OUTPATIENT
Start: 2021-08-29

## 2021-08-29 ASSESSMENT — ENCOUNTER SYMPTOMS
SHORTNESS OF BREATH: 0
RHINORRHEA: 0
BACK PAIN: 0
COUGH: 0
EYE DISCHARGE: 0
TROUBLE SWALLOWING: 0
CONSTIPATION: 0
ABDOMINAL PAIN: 0
EYE REDNESS: 0
WHEEZING: 0
SORE THROAT: 0
COLOR CHANGE: 0
VOMITING: 0
NAUSEA: 0
EYE PAIN: 0
BLOOD IN STOOL: 0
DIARRHEA: 0

## 2021-08-29 NOTE — ED TRIAGE NOTES
Pt c/o chronic lifelong rash that has been worse in the past 2 days. Her other c/o cp and low bp was yesterday by an evaluation by ems at a [facility]. Pt denies cp at this time.

## 2021-08-29 NOTE — ED PROVIDER NOTES
[address] [facility] ED  EMERGENCY DEPARTMENT ENCOUNTER      Pt Name: Chevy Abdalla  MRN: [id number]  [place] 1995  Date of evaluation: 8/29/2021  Provider: MICHELLE Parr [id number]       Chief Complaint   Patient presents with    Rash     pt c/o chrnoic lifelong rash that is worsening in the past 2 days. She also c/o cp since yesterday and [place] after pt was shaking and vomiting         HISTORY OF PRESENT ILLNESS   (Location/Symptom, Timing/Onset,Context/Setting, Quality, Duration, Modifying Factors, Severity)  Note limiting factors. Chevy Abdalla is a 27 y.o. female who presents to the emergency department with no past medical history for a chief complaint of diffuse bodily rash. She has had this rash since she was a kid. She reports she came to be evaluated for it because she felt like the burning and itching sensation she gets from it has gotten worse. She reports that spread all through her body. She has tried seeing a dermatologist for it before and she states that the dermatologist told her they will no longer see her because they cannot provide her with the help she needs. She denies any chest pain, shortness of breath, numbness tingling, or being allergic to something specific. She is tried Benadryl, steroids, antibiotics, and nothing has succeeded in helping alleviating her symptoms. Nursing Notes were reviewed. REVIEW OF SYSTEMS    (2-9 systems for level 4, 10 or more for level 5)     Review of Systems   Constitutional: Negative for activity change, appetite change, fatigue and fever. HENT: Negative for congestion, ear pain, rhinorrhea, sore throat and trouble swallowing. Eyes: Negative for pain, discharge and redness. Respiratory: Negative for cough, shortness of breath and wheezing. Cardiovascular: Negative for chest pain and palpitations.    Gastrointestinal: Negative for abdominal pain, blood in stool, constipation, diarrhea, nausea and vomiting. Endocrine: Negative for polydipsia and polyuria. Genitourinary: Negative for decreased urine volume, dysuria, flank pain and hematuria. Musculoskeletal: Negative for arthralgias, back pain and myalgias. Skin: Positive for rash. Negative for color change and wound. Neurological: Negative for dizziness, syncope, weakness, light-headedness and headaches. Psychiatric/Behavioral: Negative for behavioral problems. All other systems reviewed and are negative. Except as noted above the remainder of the review of systems was reviewed and negative. PAST MEDICAL HISTORY     Past Medical History:   Diagnosis Date    Gestational hypertension, third trimester      uterine contractions in third trimester, antepartum 10/5/2016     Past Surgical History:   Procedure Laterality Date     SECTION       Social History     Socioeconomic History    Marital status: Single     Spouse name: None    Number of children: None    Years of education: None    Highest education level: None   Occupational History    None   Tobacco Use    Smoking status: Current Every Day Smoker     Types: Cigars    Smokeless tobacco: Never Used    Tobacco comment: black n milds   Vaping Use    Vaping Use: Never used   Substance and Sexual Activity    Alcohol use: Yes     Comment: occas    Drug use: No    Sexual activity: Yes     Partners: Male   Other Topics Concern    None   Social History Narrative    None     Social Determinants of Health     Financial Resource Strain: Low Risk     Difficulty of Paying Living Expenses: Not hard at all   Food Insecurity: No Food Insecurity    Worried About Running Out of Food in the Last Year: Never true    Alcira of Food in the Last Year: Never true   Transportation Needs:     Lack of Transportation (Medical):      Lack of Transportation (Non-Medical):    Physical Activity:     Days of Exercise per Week:     Minutes of Mental Status: She is alert and oriented to person, place, and time. Cranial Nerves: No cranial nerve deficit. Psychiatric:         Behavior: Behavior normal.         RESULTS     EKG: All EKG's are interpreted by the Emergency Department Physician who either signs or Co-signsthis chart in the absence of a cardiologist.        RADIOLOGY:   Levie Pila such as CT, Ultrasound and MRI are read by the radiologist. Plain radiographic images are visualized and preliminarily interpreted by the emergency physician with the below findings:        Interpretation per the Radiologist below, if available at the time ofthis note:    No orders to display         ED BEDSIDE ULTRASOUND:   Performed by ED Physician - none    LABS:  Labs Reviewed   POC PREGNANCY UR-QUAL       All other labs were within normal range or not returned as of this dictation. EMERGENCY DEPARTMENT COURSE and DIFFERENTIAL DIAGNOSIS/MDM:   Vitals:    Vitals:    08/29/21 1444   BP: 105/61   Pulse: 79   Resp: 20   Temp: 98.7 °F (37.1 °C)   TempSrc: Oral   SpO2: 100%   Weight: 113 lb (51.3 kg)   Height: 5' 7\" (1.702 m)            MDM     Patient is a 81-year-old female presenting the emergency department with a chief complaint of diffuse rash. She is hemodynamically stable nontoxic-appearing. Dr. Ronne Apley also saw this patient. We think that it could be ringworm but patient needs to see dermatology for definite diagnosis. Sent home with clotrimazole and instructed to follow-up with dermatology and come back to the ER if worse. Discharged in stable condition with stable vitals. CRITICAL CARE TIME       CONSULTS:  None    PROCEDURES:  Unless otherwise noted below, none     Procedures    FINAL IMPRESSION      1.  Rash and other nonspecific skin eruption          DISPOSITION/PLAN   DISPOSITION Decision To Discharge 08/29/2021 04:34:05 PM      PATIENT REFERRED TO:  Ashanti Zavala MD  [facility]  [id number] [address]  [phone]    Schedule an appointment as soon as possible for a visit in 1 day      [address] [facility] [id number] [phone]  Schedule an appointment as soon as possible for a visit in 1 day        DISCHARGE MEDICATIONS:  New Prescriptions    CLOTRIMAZOLE (LOTRIMIN AF) 1 % CREAM    Apply topically 2 times daily.           (Please notethat portions of this note were completed with a voice recognition program.  Efforts were made to edit the dictations but occasionally words are mis-transcribed.)    MICHELLE Monsalve CNP (electronically signed)  Attending Emergency Physician          MICHELLE Nicole CNP  08/29/21 [id number]

## 2022-11-10 ENCOUNTER — APPOINTMENT (OUTPATIENT)
Dept: CT IMAGING | Age: 27
End: 2022-11-10
Payer: COMMERCIAL

## 2022-11-10 ENCOUNTER — HOSPITAL ENCOUNTER (EMERGENCY)
Age: 27
Discharge: HOME | End: 2022-11-10
Payer: COMMERCIAL

## 2022-11-10 VITALS
RESPIRATION RATE: 20 BRPM | HEIGHT: 67 IN | TEMPERATURE: 99.1 F | DIASTOLIC BLOOD PRESSURE: 65 MMHG | WEIGHT: 133 LBS | SYSTOLIC BLOOD PRESSURE: 106 MMHG | HEART RATE: 60 BPM | OXYGEN SATURATION: 99 % | BODY MASS INDEX: 20.88 KG/M2

## 2022-11-10 DIAGNOSIS — R20.2 PARESTHESIA: Primary | ICD-10-CM

## 2022-11-10 DIAGNOSIS — R51.9 NONINTRACTABLE HEADACHE, UNSPECIFIED CHRONICITY PATTERN, UNSPECIFIED HEADACHE TYPE: ICD-10-CM

## 2022-11-10 LAB
ALBUMIN SERPL-MCNC: 4.5 G/DL (ref 3.5–4.6)
ALP BLD-CCNC: 87 U/L (ref 40–130)
ALT SERPL-CCNC: 14 U/L (ref 0–33)
ANION GAP SERPL CALCULATED.3IONS-SCNC: 11 MEQ/L (ref 9–15)
AST SERPL-CCNC: 15 U/L (ref 0–35)
BASOPHILS ABSOLUTE: 0 K/UL (ref 0–0.2)
BASOPHILS RELATIVE PERCENT: 0.4 %
BILIRUB SERPL-MCNC: 1.2 MG/DL (ref 0.2–0.7)
BUN BLDV-MCNC: 8 MG/DL (ref 6–20)
C-REACTIVE PROTEIN: <3 MG/L (ref 0–5)
CALCIUM SERPL-MCNC: 9.4 MG/DL (ref 8.5–9.9)
CHLORIDE BLD-SCNC: 104 MEQ/L (ref 95–107)
CO2: 22 MEQ/L (ref 20–31)
CREAT SERPL-MCNC: 0.49 MG/DL (ref 0.5–0.9)
EOSINOPHILS ABSOLUTE: 0.1 K/UL (ref 0–0.7)
EOSINOPHILS RELATIVE PERCENT: 1.4 %
GFR SERPL CREATININE-BSD FRML MDRD: >60 ML/MIN/{1.73_M2}
GLOBULIN: 3 G/DL (ref 2.3–3.5)
GLUCOSE BLD-MCNC: 84 MG/DL (ref 70–99)
HCT VFR BLD CALC: 42.2 % (ref 37–47)
HEMOGLOBIN: 14.3 G/DL (ref 12–16)
LYMPHOCYTES ABSOLUTE: 1.8 K/UL (ref 1–4.8)
LYMPHOCYTES RELATIVE PERCENT: 36.6 %
MAGNESIUM: 2 MG/DL (ref 1.7–2.4)
MCH RBC QN AUTO: 31.2 PG (ref 27–31.3)
MCHC RBC AUTO-ENTMCNC: 33.8 % (ref 33–37)
MCV RBC AUTO: 92.3 FL (ref 79.4–94.8)
MONOCYTES ABSOLUTE: 0.3 K/UL (ref 0.2–0.8)
MONOCYTES RELATIVE PERCENT: 6.6 %
NEUTROPHILS ABSOLUTE: 2.8 K/UL (ref 1.4–6.5)
NEUTROPHILS RELATIVE PERCENT: 55 %
PDW BLD-RTO: 13.8 % (ref 11.5–14.5)
PLATELET # BLD: 256 K/UL (ref 130–400)
POTASSIUM SERPL-SCNC: 4 MEQ/L (ref 3.4–4.9)
RBC # BLD: 4.57 M/UL (ref 4.2–5.4)
SODIUM BLD-SCNC: 137 MEQ/L (ref 135–144)
TOTAL PROTEIN: 7.5 G/DL (ref 6.3–8)
TSH SERPL DL<=0.05 MIU/L-ACNC: 1.03 UIU/ML (ref 0.44–3.86)
WBC # BLD: 5.1 K/UL (ref 4.8–10.8)

## 2022-11-10 PROCEDURE — 70450 CT HEAD/BRAIN W/O DYE: CPT

## 2022-11-10 PROCEDURE — 99284 EMERGENCY DEPT VISIT MOD MDM: CPT

## 2022-11-10 PROCEDURE — 84443 ASSAY THYROID STIM HORMONE: CPT

## 2022-11-10 PROCEDURE — 83735 ASSAY OF MAGNESIUM: CPT

## 2022-11-10 PROCEDURE — 36415 COLL VENOUS BLD VENIPUNCTURE: CPT

## 2022-11-10 PROCEDURE — 86140 C-REACTIVE PROTEIN: CPT

## 2022-11-10 PROCEDURE — 80053 COMPREHEN METABOLIC PANEL: CPT

## 2022-11-10 PROCEDURE — 96374 THER/PROPH/DIAG INJ IV PUSH: CPT

## 2022-11-10 PROCEDURE — 85025 COMPLETE CBC W/AUTO DIFF WBC: CPT

## 2022-11-10 PROCEDURE — 6360000002 HC RX W HCPCS: Performed by: PHYSICIAN ASSISTANT

## 2022-11-10 PROCEDURE — 85652 RBC SED RATE AUTOMATED: CPT

## 2022-11-10 RX ORDER — KETOROLAC TROMETHAMINE 30 MG/ML
30 INJECTION, SOLUTION INTRAMUSCULAR; INTRAVENOUS ONCE
Status: COMPLETED | OUTPATIENT
Start: 2022-11-10 | End: 2022-11-10

## 2022-11-10 RX ORDER — ETODOLAC 400 MG/1
400 TABLET, FILM COATED ORAL 2 TIMES DAILY
Qty: 14 TABLET | Refills: 0 | Status: SHIPPED | OUTPATIENT
Start: 2022-11-10

## 2022-11-10 RX ORDER — CYCLOBENZAPRINE HCL 10 MG
10 TABLET ORAL 3 TIMES DAILY PRN
Qty: 21 TABLET | Refills: 0 | Status: SHIPPED | OUTPATIENT
Start: 2022-11-10 | End: 2022-11-20

## 2022-11-10 RX ADMIN — KETOROLAC TROMETHAMINE 30 MG: 30 INJECTION, SOLUTION INTRAMUSCULAR; INTRAVENOUS at 21:06

## 2022-11-10 ASSESSMENT — ENCOUNTER SYMPTOMS
ABDOMINAL DISTENTION: 0
COUGH: 0
EYE DISCHARGE: 0
NAUSEA: 0
APNEA: 0
ANAL BLEEDING: 0
VOMITING: 0
VOICE CHANGE: 0

## 2022-11-10 ASSESSMENT — PAIN - FUNCTIONAL ASSESSMENT: PAIN_FUNCTIONAL_ASSESSMENT: 0-10

## 2022-11-10 ASSESSMENT — PAIN SCALES - GENERAL: PAINLEVEL_OUTOF10: 8

## 2022-11-10 NOTE — Clinical Note
Sherrie Rosenbaum was seen and treated in our emergency department on 11/10/2022. She may return to work on 11/14/2022. If you have any questions or concerns, please don't hesitate to call.       Patrick Chadwick PA-C

## 2022-11-10 NOTE — ED TRIAGE NOTES
Patient complains of headache x 3 days, along with intermittent pins and needles feeling in bilateral arms and pinching neck pain. Patient denies other symptoms and recent illness. Denies injury or trauma. Vitals stable.

## 2022-11-11 LAB — SEDIMENTATION RATE, ERYTHROCYTE: 5 MM (ref 0–20)

## 2022-11-11 NOTE — DISCHARGE INSTRUCTIONS
Follow-up with primary care physician and neurology. Return to if any symptoms worsen or new symptoms develop.

## 2022-11-11 NOTE — ED PROVIDER NOTES
[address] [facility] ED  eMERGENCY dEPARTMENT eNCOUnter      Pt Name: Tamra Meyer  MRN: [id number]  [place] 1995  Date of evaluation: 11/10/2022  Provider: Byron Zavala PA-C    CHIEF COMPLAINT       Chief Complaint   Patient presents with    Headache         HISTORY OF PRESENT ILLNESS   (Location/Symptom, Timing/Onset,Context/Setting, Quality, Duration, Modifying Factors, Severity)  Note limiting factors. Tamra Meyer is a 24 y.o. female who presents to the emergency department   complaints of headache x3 weeks along with bilateral arm neck pain she notes that she is got left-sided pins-and-needles feelings in her arms and left leg. Denies fever chills nausea vomiting. Denies any injury denies any trauma denies any additional symptoms. HPI    NursingNotes were reviewed. REVIEW OF SYSTEMS    (2-9 systems for level 4, 10 or more for level 5)     Review of Systems   Constitutional:  Negative for activity change, appetite change, fever and unexpected weight change. HENT:  Negative for ear discharge, nosebleeds and voice change. Eyes:  Negative for discharge. Respiratory:  Negative for apnea and cough. Cardiovascular:  Negative for chest pain. Gastrointestinal:  Negative for abdominal distention, anal bleeding, nausea and vomiting. Genitourinary:  Negative for dysuria and hematuria. Musculoskeletal:  Negative for joint swelling. Skin:  Negative for pallor. Neurological:  Positive for numbness and headaches. Negative for dizziness, seizures, syncope, facial asymmetry, speech difficulty and weakness. Psychiatric/Behavioral:  Negative for behavioral problems, self-injury and sleep disturbance. All other systems reviewed and are negative. Except as noted above the remainder of the review of systems was reviewed and negative.        PAST MEDICAL HISTORY     Past Medical History:   Diagnosis Date    Gestational hypertension, third trimester      uterine contractions in third trimester, antepartum 10/5/2016         SURGICALHISTORY       Past Surgical History:   Procedure Laterality Date     SECTION           CURRENT MEDICATIONS       Previous Medications    CLOTRIMAZOLE (LOTRIMIN AF) 1 % CREAM    Apply topically 2 times daily. Patient has no known allergies. FAMILY HISTORY       Family History   Problem Relation Age of Onset    Diabetes Father           SOCIAL HISTORY       Social History     Socioeconomic History    Marital status: Single     Spouse name: None    Number of children: None    Years of education: None    Highest education level: None   Tobacco Use    Smoking status: Every Day     Types: Cigars    Smokeless tobacco: Never    Tobacco comments:     black n milds   Vaping Use    Vaping Use: Never used   Substance and Sexual Activity    Alcohol use: Yes     Comment: occas    Drug use: No    Sexual activity: Yes     Partners: Male       SCREENINGS   Sharon Coma Scale  Eye Opening: Spontaneous  Best Verbal Response: Oriented  Best Motor Response: Obeys commands  Sharon Coma Scale Score: 15  Ebola Virus Disease (EVD) Screening   Temp: 99.1 °F (37.3 °C)  CIWA Assessment  BP: 106/65  Heart Rate: 60    PHYSICAL EXAM    (up to 7 for level 4, 8 or more for level 5)     ED Triage Vitals [11/10/22 1603]   BP Temp Temp Source Heart Rate Resp SpO2 Height Weight   106/65 99.1 °F (37.3 °C) Oral 60 20 99 % 5' 7\" (1.702 m) 133 lb (60.3 kg)       Physical Exam  Vitals and nursing note reviewed. Constitutional:       General: She is not in acute distress. Appearance: She is well-developed. HENT:      Head: Normocephalic and atraumatic. Right Ear: Tympanic membrane and external ear normal.      Left Ear: Tympanic membrane and external ear normal.      Nose: Nose normal.      Mouth/Throat:      Mouth: Mucous membranes are moist.   Eyes:      General:         Right eye: No discharge. Left eye: No discharge.       Pupils: Pupils are equal, round, and reactive to light. Cardiovascular:      Rate and Rhythm: Normal rate and regular rhythm. Pulses: Normal pulses. Heart sounds: Normal heart sounds. Pulmonary:      Effort: Pulmonary effort is normal. No respiratory distress. Breath sounds: Normal breath sounds. No stridor. Abdominal:      General: Bowel sounds are normal. There is no distension. Palpations: Abdomen is soft. Tenderness: There is no right CVA tenderness or left CVA tenderness. Musculoskeletal:         General: Normal range of motion. Cervical back: Normal range of motion and neck supple. Skin:     General: Skin is warm. Findings: No erythema. Neurological:      Mental Status: She is alert and oriented to person, place, and time. Psychiatric:         Mood and Affect: Mood normal.       RESULTS     EKG: All EKG's are interpreted by the Emergency Department Physician who either signs or Co-signsthis chart in the absence of a cardiologist.         RADIOLOGY:   Hezzie Herrera such as CT, Ultrasound and MRI are read by the radiologist. Cherelle Bach radiographic images are visualized and preliminarily interpreted by the emergency physician with the below findings:         Interpretation per the Radiologist below, if available at the time ofthis note:    CT Head W/O Contrast   Final Result   No acute intracranial abnormality. ED BEDSIDE ULTRASOUND:   Performed by ED Physician - none    LABS:  Labs Reviewed   COMPREHENSIVE METABOLIC PANEL - Abnormal; Notable for the following components:       Result Value    Creatinine 0.49 (*)     Total Bilirubin 1.2 (*)     All other components within normal limits   CBC WITH AUTO DIFFERENTIAL   MAGNESIUM   TSH   C-REACTIVE PROTEIN   SEDIMENTATION RATE       All other labs were within normal range or not returned as of this dictation.     EMERGENCY DEPARTMENT COURSE and DIFFERENTIAL DIAGNOSIS/MDM:   Vitals:    Vitals:    11/10/22 1603   BP: 106/65 Pulse: 60   Resp: 20   Temp: 99.1 °F (37.3 °C)   TempSrc: Oral   SpO2: 99%   Weight: 133 lb (60.3 kg)   Height: 5' 7\" (1.702 m)            MDM  Number of Diagnoses or Management Options  Nonintractable headache, unspecified chronicity pattern, unspecified headache type  Paresthesia  Diagnosis management comments: Patient has 3-week history of symptoms. We will add anti-inflammatory and muscle relaxers. Patient works as a health aide. She is to follow-up with primary care as well as neurology       Amount and/or Complexity of Data Reviewed  Clinical lab tests: reviewed and ordered  Tests in the radiology section of CPT®: reviewed and ordered        CRITICAL CARE TIME       CONSULTS:  None    PROCEDURES:  Unless otherwise noted below, none     Procedures    FINAL IMPRESSION      1. Paresthesia    2.  Nonintractable headache, unspecified chronicity pattern, unspecified headache type          DISPOSITION/PLAN   DISPOSITION Decision To Discharge 11/10/2022 08:50:28 PM      PATIENT REFERRED TO:  Renold Galeazzi, [address] [facility]  [id number]  [address]  [phone]    Call in 1 day      Humaira Jordan MD  [address] [facility] [phone]    Call in 1 day      [facility]) ED  [address] [facility]  [phone]  Go to   If symptoms worsen      DISCHARGE MEDICATIONS:  New Prescriptions    CYCLOBENZAPRINE (FLEXERIL) 10 MG TABLET    Take 1 tablet by mouth 3 times daily as needed for Muscle spasms    ETODOLAC (LODINE) 400 MG TABLET    Take 1 tablet by mouth 2 times daily          (Please note that portions of this note were completed with a voice recognition program.  Efforts were made to edit the dictations but occasionally words are mis-transcribed.)    Caden Bobby PA-C (electronically signed)  Attending Emergency Physician        Caden Bobby PA-C  11/11/22 1716

## 2023-01-07 ENCOUNTER — HOSPITAL ENCOUNTER (INPATIENT)
Age: 28
LOS: 4 days | Discharge: HOME | DRG: 751 | End: 2023-01-11
Attending: PSYCHIATRY & NEUROLOGY | Admitting: PSYCHIATRY & NEUROLOGY
Payer: COMMERCIAL

## 2023-01-07 DIAGNOSIS — F10.920 ACUTE ALCOHOLIC INTOXICATION WITHOUT COMPLICATION (HCC): ICD-10-CM

## 2023-01-07 DIAGNOSIS — R45.851 DEPRESSION WITH SUICIDAL IDEATION: Primary | ICD-10-CM

## 2023-01-07 DIAGNOSIS — F32.A DEPRESSION WITH SUICIDAL IDEATION: Primary | ICD-10-CM

## 2023-01-07 LAB
ACETAMINOPHEN LEVEL: <5 UG/ML (ref 10–30)
ALBUMIN SERPL-MCNC: 4.9 G/DL (ref 3.5–4.6)
ALP BLD-CCNC: 90 U/L (ref 40–130)
ALT SERPL-CCNC: 18 U/L (ref 0–33)
AMPHETAMINE SCREEN, URINE: NORMAL
ANION GAP SERPL CALCULATED.3IONS-SCNC: 12 MEQ/L (ref 9–15)
AST SERPL-CCNC: 20 U/L (ref 0–35)
BACTERIA: NEGATIVE /HPF
BARBITURATE SCREEN URINE: NORMAL
BASOPHILS ABSOLUTE: 0.1 K/UL (ref 0–0.2)
BASOPHILS RELATIVE PERCENT: 0.9 %
BENZODIAZEPINE SCREEN, URINE: NORMAL
BILIRUB SERPL-MCNC: 1 MG/DL (ref 0.2–0.7)
BILIRUBIN URINE: NEGATIVE
BLOOD, URINE: ABNORMAL
BUN BLDV-MCNC: 7 MG/DL (ref 6–20)
CALCIUM SERPL-MCNC: 9.4 MG/DL (ref 8.5–9.9)
CANNABINOID SCREEN URINE: NORMAL
CHLORIDE BLD-SCNC: 107 MEQ/L (ref 95–107)
CHOLESTEROL, TOTAL: 166 MG/DL (ref 0–199)
CLARITY: CLEAR
CO2: 23 MEQ/L (ref 20–31)
COCAINE METABOLITE SCREEN URINE: NORMAL
COLOR: YELLOW
CREAT SERPL-MCNC: 0.53 MG/DL (ref 0.5–0.9)
EOSINOPHILS ABSOLUTE: 0 K/UL (ref 0–0.7)
EOSINOPHILS RELATIVE PERCENT: 0.4 %
EPITHELIAL CELLS, UA: ABNORMAL /HPF (ref 0–5)
ETHANOL PERCENT: 0.08 G/DL
ETHANOL: 93 MG/DL (ref 0–0.08)
FENTANYL SCREEN, URINE: NORMAL
GFR SERPL CREATININE-BSD FRML MDRD: >60 ML/MIN/{1.73_M2}
GLOBULIN: 3.2 G/DL (ref 2.3–3.5)
GLUCOSE BLD-MCNC: 98 MG/DL (ref 70–99)
GLUCOSE URINE: NEGATIVE MG/DL
HCG(URINE) PREGNANCY TEST: NEGATIVE
HCT VFR BLD CALC: 42.2 % (ref 37–47)
HDLC SERPL-MCNC: 63 MG/DL (ref 40–59)
HEMOGLOBIN: 14.1 G/DL (ref 12–16)
HYALINE CASTS: ABNORMAL /HPF (ref 0–5)
KETONES, URINE: NEGATIVE MG/DL
LDL CHOLESTEROL CALCULATED: 90 MG/DL (ref 0–129)
LEUKOCYTE ESTERASE, URINE: ABNORMAL
LYMPHOCYTES ABSOLUTE: 2.3 K/UL (ref 1–4.8)
LYMPHOCYTES RELATIVE PERCENT: 38.5 %
Lab: NORMAL
MCH RBC QN AUTO: 31.1 PG (ref 27–31.3)
MCHC RBC AUTO-ENTMCNC: 33.4 % (ref 33–37)
MCV RBC AUTO: 93.1 FL (ref 79.4–94.8)
METHADONE SCREEN, URINE: NORMAL
MONOCYTES ABSOLUTE: 0.4 K/UL (ref 0.2–0.8)
MONOCYTES RELATIVE PERCENT: 6.5 %
NEUTROPHILS ABSOLUTE: 3.3 K/UL (ref 1.4–6.5)
NEUTROPHILS RELATIVE PERCENT: 53.7 %
NITRITE, URINE: NEGATIVE
OPIATE SCREEN URINE: NORMAL
OXYCODONE URINE: NORMAL
PDW BLD-RTO: 13.9 % (ref 11.5–14.5)
PH UA: 6.5 (ref 5–9)
PHENCYCLIDINE SCREEN URINE: NORMAL
PLATELET # BLD: 327 K/UL (ref 130–400)
POTASSIUM SERPL-SCNC: 4 MEQ/L (ref 3.4–4.9)
PROPOXYPHENE SCREEN: NORMAL
PROTEIN UA: ABNORMAL MG/DL
RBC # BLD: 4.53 M/UL (ref 4.2–5.4)
RBC UA: ABNORMAL /HPF (ref 0–5)
SALICYLATE, SERUM: <0.3 MG/DL (ref 15–30)
SARS-COV-2, NAAT: NOT DETECTED
SODIUM BLD-SCNC: 142 MEQ/L (ref 135–144)
SPECIFIC GRAVITY UA: 1.02 (ref 1–1.03)
TOTAL CK: 107 U/L (ref 0–170)
TOTAL PROTEIN: 8.1 G/DL (ref 6.3–8)
TRIGL SERPL-MCNC: 66 MG/DL (ref 0–150)
TSH SERPL DL<=0.05 MIU/L-ACNC: 1.3 UIU/ML (ref 0.44–3.86)
URINE REFLEX TO CULTURE: ABNORMAL
UROBILINOGEN, URINE: 1 E.U./DL
WBC # BLD: 6.1 K/UL (ref 4.8–10.8)
WBC UA: ABNORMAL /HPF (ref 0–5)

## 2023-01-07 PROCEDURE — 36415 COLL VENOUS BLD VENIPUNCTURE: CPT

## 2023-01-07 PROCEDURE — 80053 COMPREHEN METABOLIC PANEL: CPT

## 2023-01-07 PROCEDURE — 82077 ASSAY SPEC XCP UR&BREATH IA: CPT

## 2023-01-07 PROCEDURE — 84703 CHORIONIC GONADOTROPIN ASSAY: CPT

## 2023-01-07 PROCEDURE — 82550 ASSAY OF CK (CPK): CPT

## 2023-01-07 PROCEDURE — 1240000000 HC EMOTIONAL WELLNESS R&B

## 2023-01-07 PROCEDURE — 80061 LIPID PANEL: CPT

## 2023-01-07 PROCEDURE — 6370000000 HC RX 637 (ALT 250 FOR IP): Performed by: PSYCHIATRY & NEUROLOGY

## 2023-01-07 PROCEDURE — 80307 DRUG TEST PRSMV CHEM ANLYZR: CPT

## 2023-01-07 PROCEDURE — 99285 EMERGENCY DEPT VISIT HI MDM: CPT

## 2023-01-07 PROCEDURE — 80179 DRUG ASSAY SALICYLATE: CPT

## 2023-01-07 PROCEDURE — 85025 COMPLETE CBC W/AUTO DIFF WBC: CPT

## 2023-01-07 PROCEDURE — 84443 ASSAY THYROID STIM HORMONE: CPT

## 2023-01-07 PROCEDURE — 80143 DRUG ASSAY ACETAMINOPHEN: CPT

## 2023-01-07 PROCEDURE — 87635 SARS-COV-2 COVID-19 AMP PRB: CPT

## 2023-01-07 PROCEDURE — 81001 URINALYSIS AUTO W/SCOPE: CPT

## 2023-01-07 RX ORDER — HYDROXYZINE PAMOATE 50 MG/1
50 CAPSULE ORAL EVERY 6 HOURS PRN
Status: DISCONTINUED | OUTPATIENT
Start: 2023-01-07 | End: 2023-01-11 | Stop reason: HOSPADM

## 2023-01-07 RX ORDER — BENZTROPINE MESYLATE 1 MG/ML
2 INJECTION INTRAMUSCULAR; INTRAVENOUS 2 TIMES DAILY PRN
Status: DISCONTINUED | OUTPATIENT
Start: 2023-01-07 | End: 2023-01-11 | Stop reason: HOSPADM

## 2023-01-07 RX ORDER — PROPRANOLOL HYDROCHLORIDE 80 MG/1
2 CAPSULE, EXTENDED RELEASE ORAL DAILY
Status: ON HOLD | COMMUNITY
Start: 2022-10-21 | End: 2023-01-11 | Stop reason: HOSPADM

## 2023-01-07 RX ORDER — MAGNESIUM HYDROXIDE/ALUMINUM HYDROXICE/SIMETHICONE 120; 1200; 1200 MG/30ML; MG/30ML; MG/30ML
30 SUSPENSION ORAL PRN
Status: DISCONTINUED | OUTPATIENT
Start: 2023-01-07 | End: 2023-01-11 | Stop reason: HOSPADM

## 2023-01-07 RX ORDER — HYDROXYZINE HYDROCHLORIDE 50 MG/ML
50 INJECTION, SOLUTION INTRAMUSCULAR EVERY 6 HOURS PRN
Status: DISCONTINUED | OUTPATIENT
Start: 2023-01-07 | End: 2023-01-11 | Stop reason: HOSPADM

## 2023-01-07 RX ORDER — TRAZODONE HYDROCHLORIDE 50 MG/1
50 TABLET ORAL NIGHTLY PRN
Status: DISCONTINUED | OUTPATIENT
Start: 2023-01-07 | End: 2023-01-11 | Stop reason: HOSPADM

## 2023-01-07 RX ORDER — ACETAMINOPHEN 325 MG/1
650 TABLET ORAL EVERY 4 HOURS PRN
Status: DISCONTINUED | OUTPATIENT
Start: 2023-01-07 | End: 2023-01-11 | Stop reason: HOSPADM

## 2023-01-07 RX ORDER — HALOPERIDOL 5 MG/1
5 TABLET ORAL EVERY 6 HOURS PRN
Status: DISCONTINUED | OUTPATIENT
Start: 2023-01-07 | End: 2023-01-11 | Stop reason: HOSPADM

## 2023-01-07 RX ORDER — HALOPERIDOL 5 MG/ML
5 INJECTION INTRAMUSCULAR EVERY 6 HOURS PRN
Status: DISCONTINUED | OUTPATIENT
Start: 2023-01-07 | End: 2023-01-11 | Stop reason: HOSPADM

## 2023-01-07 RX ORDER — MIRTAZAPINE 15 MG/1
15 TABLET, FILM COATED ORAL NIGHTLY
Status: DISCONTINUED | OUTPATIENT
Start: 2023-01-07 | End: 2023-01-11 | Stop reason: HOSPADM

## 2023-01-07 RX ADMIN — MIRTAZAPINE 15 MG: 15 TABLET, FILM COATED ORAL at 21:17

## 2023-01-07 ASSESSMENT — PATIENT HEALTH QUESTIONNAIRE - PHQ9: SUM OF ALL RESPONSES TO PHQ QUESTIONS 1-9: 19

## 2023-01-07 ASSESSMENT — ENCOUNTER SYMPTOMS
ABDOMINAL DISTENTION: 0
ABDOMINAL PAIN: 0
RHINORRHEA: 0
SHORTNESS OF BREATH: 0
COLOR CHANGE: 0
CONSTIPATION: 0
SORE THROAT: 0
EYE DISCHARGE: 0

## 2023-01-07 ASSESSMENT — SLEEP AND FATIGUE QUESTIONNAIRES
AVERAGE NUMBER OF SLEEP HOURS: 2
DO YOU HAVE DIFFICULTY SLEEPING: YES
DO YOU USE A SLEEP AID: NO

## 2023-01-07 ASSESSMENT — LIFESTYLE VARIABLES
HOW MANY STANDARD DRINKS CONTAINING ALCOHOL DO YOU HAVE ON A TYPICAL DAY: 3 OR 4
HOW OFTEN DO YOU HAVE A DRINK CONTAINING ALCOHOL: 2-4 TIMES A MONTH

## 2023-01-07 ASSESSMENT — PAIN SCALES - GENERAL: PAINLEVEL_OUTOF10: 0

## 2023-01-07 NOTE — PROGRESS NOTES
Pt has been visible on the unit, talking on the phone and coming out to eat meals. Pt states that she would like to rest and focus on herself while staying here. Pt denies SI, HI, AVH. Pt was tearful upon assessment when talking about the recent passing of her father. Pt states that she hasn't been sleeping since his passing and has only been snacking, not eating full meals. Pt states that she feels like she can't grieve or rest because she has to take care of her 4 children and is crying frequently at home. Pt remains cooperative and friendly towards staff. Pt is currently in bed.

## 2023-01-07 NOTE — PROGRESS NOTES
Pt. refused to attend the 1000 skills group, despite staff encouragement.  Electronically signed by Mackenzie Fischer, [address] on 1/7/2023 at 2:33 PM

## 2023-01-07 NOTE — H&P
[address] [place] [address] Department of Psychiatry    History and Physical - Adult         CHIEF COMPLAINT:  Depression SI    History obtained from:  patient    Patient was seen after discussing with the treatment team and reviewing the chart        CIRCUMSTANCES OF ADMISSION:     Patient presents to the ED by squad after she was taken by her boyfriend to [facility] after an interrupted suicide attempt. Patient admits she barricaded herself in the bathroom and planned to overdose on blood pressure pills in the medicine cabinet until her boyfriend broke down the door to stop her. She admits she has been experiencing symptoms of depression since her father passed in December and has been having thoughts to harm herself for the past three days. States \"overdosing to kill myself would just bee the easiest way. \" She verbalizes she has been having difficulty functioning due to her symptoms. She reports she has been isolating and has been feeling more angry lately. States \"I just cant' be without my Dad, he was all I had in my life. \" States \"I don't think I have slept since he . \" Reports difficulty falling and staying asleep. Patient states she has no appetite and has barely been eating. Patient cooperative with assessment and answers questions appropriatley with organized thought process. Mood depression with flat affect. Tearful frequently during assessment. Speech soft and quiet. Denies HI and denies A/V hallucinations.      HISTORY OF PRESENT ILLNESS:      The patient is a 24 y.o. female single live with BF, 4 children- 5,7,2,5 y/o unemployed with no significant past history    Pt has been feeling depressed since the death of her dad  Poor sleep with EMW at 4 AM and not sleep in general  Severity: Rating mood to be around 2/10 (10- good)  Quality:melancholic  Worse in the morning  Content: Hopeless, worthless and helpless feeling  Suicidal thoughts - thinking about overdose, barricaded herself in the bathroom want to take overdose with BP pills  Associated symptoms:  Poor concentration, anhedonia, decrease motivation  Sleep and appetite- poor    Stressors: Lost her dad last month, was her main carer. The patient is not currently receiving care for the above psychiatric illness. Medications Prior to Admission:   Medications Prior to Admission: propranolol (INDERAL LA) 80 MG extended release capsule, Take 2 capsules by mouth daily    Compliance:n/a    Psychiatric Review of Systems       Depression: yes     Rosa or Hypomania:  no     Panic Attacks:  no     Phobias:  no     Obsessions and Compulsions:  no     PTSD : no     Hallucinations:  no     Delusions:  no    Substance Abuse History:  ETOH: no   Marijuana: no  Opiates: no  Other Drugs: no      Past Psychiatric History:  Prior Diagnosis:  no  Psychiatrist: no  Therapist:no  Hospitalization: no  Hx of Suicidal Attempts: no  Hx of violence:  no  ECT: no  Previous discontinued Psychiatric Med Trials: no    Past Medical History:        Diagnosis Date    Gestational hypertension, third trimester      uterine contractions in third trimester, antepartum 10/5/2016       Past Surgical History:        Procedure Laterality Date     SECTION         Allergies:   Patient has no known allergies. Family History  Family History   Problem Relation Age of Onset    Diabetes Father          Social History:  Born and Raised: [place]  Describes Childhood:   supportive  Education: [employer]  Employment: Unemployed, not seeking work  Relationships: single  Children:  4  Current Support: romantic partner    Legal Hx: none  Access to weapons?:  No      EXAMINATION:    REVIEW OF SYSTEMS:    ROS:  [x] All negative/unchanged except if checked.  Explain positive(checked items) below:  [] Constitutional  [] Eyes  [] Ear/Nose/Mouth/Throat  [] Respiratory  [] CV  [] GI  []   [] Musculoskeletal  [] Skin/Breast  [] Neurological  [] Endocrine  [] Heme/Lymph  [] Allergic/Immunologic    Explanation:     Vitals:  /74   Pulse (!) 104   Temp 99 °F (37.2 °C)   Resp 18   Ht 5' 7\" (1.702 m)   Wt 130 lb (59 kg)   SpO2 99%   BMI 20.36 kg/m²      Neurologic Exam:   Muscle Strength & Tone: full ROM  Gait: normal gait   Involuntary Movements: no    Mental Status Examination:    Level of consciousness:  within normal limits   Appearance:  ill-appearing  Behavior/Motor:  psychomotor retardation  Attitude toward examiner:  cooperative  Speech:  slow   Mood: constricted, decreased range, and depressed  Affect:  mood congruent  Thought processes:  slow   Association:  Thought content:  Suicidal Ideation:  passive  Delusions:  no evidence of delusions  Perceptual Disturbance:  denies any perceptual disturbance  Cognition:  oriented to person, place, and time   Attention & Concentration poor  Memory intact  Insight fair   Judgement fair   Fund of Knowledge adequate    Mini Mental Status 30/30      DIAGNOSIS:     MDD single episode severe          RISK ASSESSMENT:    SUICIDE RISK ASSESSMENT:high  HOMICIDE: low  AGITATION/VIOLENCE: low  ELOPEMENT: lwo    LABS: REVIEWED TODAY:  Recent Labs     01/07/23  0704   WBC 6.1   HGB 14.1        Recent Labs     01/07/23  0704      K 4.0      CO2 23   BUN 7   CREATININE 0.53   GLUCOSE 98     Recent Labs     01/07/23  0704   BILITOT 1.0*   ALKPHOS 90   AST 20   ALT 18     Lab Results   Component Value Date/Time    LABAMPH Neg 01/07/2023 07:21 AM    BARBSCNU Neg 01/07/2023 07:21 AM    LABBENZ Neg 01/07/2023 07:21 AM    LABMETH Neg 01/07/2023 07:21 AM    OPIATESCREENURINE Neg 01/07/2023 07:21 AM    PHENCYCLIDINESCREENURINE Neg 01/07/2023 07:21 AM    ETOH 93 01/07/2023 07:04 AM     Lab Results   Component Value Date/Time    TSH 1.300 01/07/2023 07:04 AM     No results found for: LITHIUM  No results found for: VALPROATE, CBMZ  No results found for: LITHIUM, VALPROATE    FURTHER LABS ORDERED :      Radiology   No results found.    EKG: TRACING REVIEWED    TREATMENT PLAN:    Risk Management: This patient was assessed for Medical bed necessity for the following reason:      Collateral Information:  Will obtain collateral information from the family or friends. Will obtain medical records as appropriate from out patient providers  Will consult the hospitalist for a physical exam to rule out any co-morbid physical condition. Home medication Reconciled       New Medications started during this admission :    See orders  Prn Haldol 5mg and Vistaril 50mg q6hr for extreme agitation. Trazodone as ordered for insomnia  Vistaril as ordered for anxiety  Discussed with the patient risk, benefit, alternative and common side effects for the  proposed medication treatment. Patient is consenting to the treatment.     Psychotherapy:   Encourage participation in milieu and group therapy  Individual therapy as needed      Electronically signed by Richelle Mcgowan MD on 1/7/2023 at 11:09 AM

## 2023-01-07 NOTE — ED NOTES
Pt is in bed area quiet and cooperative Milta Loser is here to see the pt medically for an assessment -ER staff are with Cierra Andrew at the bedside     Mike Alanis RN  01/07/23 5188

## 2023-01-07 NOTE — PLAN OF CARE
Problem: Self Harm/Suicidality  Goal: Will have no self-injury during hospital stay  Description: INTERVENTIONS:  1. Ensure constant observer at bedside with Q15M safety checks  2. Maintain a safe environment  3. Secure patient belongings  4. Ensure family/visitors adhere to safety recommendations  5. Ensure safety tray has been added to patient's diet order  6. Every shift and PRN: Re-assess suicidal risk via Frequent Screener    Outcome: Progressing     Problem: Pain  Goal: Verbalizes/displays adequate comfort level or baseline comfort level  Outcome: Progressing     Problem: Anxiety  Goal: Will report anxiety at manageable levels  Description: INTERVENTIONS:  1. Administer medication as ordered  2. Teach and rehearse alternative coping skills  3. Provide emotional support with 1:1 interaction with staff  Outcome: Progressing     Problem: Coping  Goal: Pt/Family able to verbalize concerns and demonstrate effective coping strategies  Description: INTERVENTIONS:  1. Assist patient/family to identify coping skills, available support systems and cultural and spiritual values  2. Provide emotional support, including active listening and acknowledgement of concerns of patient and caregivers  3. Reduce environmental stimuli, as able  4. Instruct patient/family in relaxation techniques, as appropriate  5. Assess for spiritual pain/suffering and initiate Spiritual Care, Psychosocial Clinical Specialist consults as needed  Outcome: Progressing     Problem: Death & Dying  Goal: Pt/Family communicate acceptance of impending death and feel psychological comfort and peace  Description: INTERVENTIONS:  1. Assess patient/family anxiety and grief process related to end of life issues  2. Provide emotional and spiritual support  3. Provide information about the patient's health status with consideration of family and cultural values  4.  Communicate willingness to discuss death and facilitate grief process  with patient/family as appropriate  5. Emphasize sustaining relationships within family system and community, or rsoe/spiritual traditions  6. Initiate Spiritual Care, Psychosocial Clinical Specialist, consult as needed  Outcome: Progressing     Problem: Decision Making  Goal: Pt/Family able to effectively weigh alternatives and participate in decision making related to treatment and care  Description: INTERVENTIONS:  1. Determine when there are differences between patient's view, family's view, and healthcare provider's view of condition  2. Facilitate patient and family articulation of goals for care  3. Help patient and family identify pros/cons of alternative solutions  4. Provide information as requested by patient/family  5. Respect patient/family right to receive or not to receive information  6. Serve as a liaison between patient and family and health care team  7. Initiate Consults from Ethics, Palliative Care or initiate [address] [place] [address] as is appropriate  Outcome: Progressing     Problem: Depression/Self Harm  Goal: Effect of psychiatric condition will be minimized and patient will be protected from self harm  Description: INTERVENTIONS:  1. Assess impact of patient's symptoms on level of functioning, self care needs and offer support as indicated  2. Assess patient/family knowledge of depression, impact on illness and need for teaching  3. Provide emotional support, presence and reassurance  4. Assess for possible suicidal thoughts or ideation. If patient expresses suicidal thoughts or statements do not leave alone, initiate Suicide Precautions, move to a room close to the nursing station and obtain sitter  5.  Initiate consults as appropriate with Mental Health Professional, Spiritual Care, Psychosocial CNS, and consider a recommendation to the LIP for a Psychiatric Consultation  Outcome: Progressing     Problem: Involuntary Admit  Goal: Will cooperate with staff recommendations and doctor's orders and will demonstrate appropriate behavior  Description: INTERVENTIONS:  1. Treat underlying conditions and offer medication as ordered  2. Educate regarding involuntary admission procedures and rules  3.  Contain excessive/inappropriate behavior per unit and hospital policies  Outcome: Progressing

## 2023-01-07 NOTE — PROGRESS NOTES
Behavioral Services  Medicare Certification Upon Admission    I certify that this patient's inpatient psychiatric hospital admission is medically necessary for:    [x] (1) Treatment which could reasonably be expected to improve this patient's condition,       [x] (2) Or for diagnostic study;     AND     [x](2) The inpatient psychiatric services are provided while the individual is under the care of a physician and are included in the individualized plan of care.     Estimated length of stay/service 3-5    Plan for post-hospital care Op care    Electronically signed by Maine Overton MD on 1/7/2023 at 11:09 AM

## 2023-01-07 NOTE — ED NOTES
Patient reviewed with Dr. Dulce Marte. Discussed events leading to admit, current assessment and labs. Patient states she has not been taking any home medications. Received order to admit to 3W with prn orders at this time.       Rickey Bagley RN  01/07/23 7404

## 2023-01-07 NOTE — FLOWSHEET NOTE
Pt has been visible out on the unit talking on the phone. When asked if she was feeling depressed pt stated \" I didn't know I was depressed \". Pt reports she has been feeling \"down\" since her father passed away in December and reported  not being able to sleep prior to coming here. Pt denies HI ,SI and A/Vhallucinations.

## 2023-01-07 NOTE — ED PROVIDER NOTES
[facility] [place] [facility]  eMERGENCY dEPARTMENT eNCOUnter      Pt Name: Luca Mcleod  MRN: [id number]  [place] 1995  Date of evaluation: 1/7/2023  Provider: Dorian Castro PA-C    CHIEF COMPLAINT       Chief Complaint   Patient presents with    Suicidal     With plan (won't share plan)         HISTORY OF PRESENT ILLNESS   (Location/Symptom, Timing/Onset,Context/Setting, Quality, Duration, Modifying Factors, Severity)  Note limiting factors. Luca Mcleod is a 24 y.o. female who presents to the emergency department complaint of ongoing depression since the death of her father December 18, 2022, from prostate cancer. Patient states that she barricaded herself in the bathroom at home and was going to overdose on pills that she had, she states her friend broke down the door, and took her to the [facility], but they advised her she needed to come to the emergency department. Patient states she did not make any attempts to harm her self at this point, but states she just wants to be with her father. Patient denies any past history of suicidal ideation or past history of depression. HPI    NursingNotes were reviewed. REVIEW OF SYSTEMS    (2-9 systems for level 4, 10 or more for level 5)     Review of Systems   Constitutional:  Negative for activity change and appetite change. HENT:  Negative for congestion, ear discharge, ear pain, nosebleeds, rhinorrhea and sore throat. Eyes:  Negative for discharge. Respiratory:  Negative for shortness of breath. Cardiovascular:  Negative for chest pain, palpitations and leg swelling. Gastrointestinal:  Negative for abdominal distention, abdominal pain and constipation. Genitourinary:  Negative for difficulty urinating and dysuria. Musculoskeletal:  Negative for arthralgias. Skin:  Negative for color change. Neurological:  Negative for dizziness, syncope, numbness and headaches. Psychiatric/Behavioral:  Positive for suicidal ideas.  Negative for agitation and confusion. Depression     Except as noted above the remainder of the review of systems was reviewed and negative. PAST MEDICAL HISTORY     Past Medical History:   Diagnosis Date    Depression, unspecified depression type 2023    Gestational hypertension, third trimester      uterine contractions in third trimester, antepartum 10/5/2016         SURGICALHISTORY       Past Surgical History:   Procedure Laterality Date     SECTION           CURRENT MEDICATIONS       Current Discharge Medication List        CONTINUE these medications which have NOT CHANGED    Details   propranolol (INDERAL LA) 80 MG extended release capsule Take 2 capsules by mouth daily             ALLERGIES     Patient has no known allergies. FAMILY HISTORY       Family History   Problem Relation Age of Onset    Diabetes Father           SOCIAL HISTORY       Social History     Socioeconomic History    Marital status: Single   Tobacco Use    Smoking status: Every Day     Types: Cigars    Smokeless tobacco: Never    Tobacco comments:     black n milds   Vaping Use    Vaping Use: Never used   Substance and Sexual Activity    Alcohol use: Yes     Comment: occas    Drug use: No    Sexual activity: Yes     Partners: Male       SCREENINGS    Liberal Coma Scale  Eye Opening: Spontaneous  Best Verbal Response: Oriented  Best Motor Response: Obeys commands  Sharon Coma Scale Score: 15 @FLOW([id number])@      PHYSICAL EXAM    (up to 7 for level 4, 8 or more for level 5)     ED Triage Vitals [23 0645]   BP Temp Temp src Heart Rate Resp SpO2 Height Weight   116/74 99 °F (37.2 °C) -- (!) 104 18 99 % 5' 7\" (1.702 m) 130 lb (59 kg)       Physical Exam  Vitals and nursing note reviewed. Constitutional:       General: She is not in acute distress. Appearance: She is well-developed. She is not ill-appearing, toxic-appearing or diaphoretic. HENT:      Head: Normocephalic. Nose: No congestion.       Mouth/Throat: Mouth: Mucous membranes are moist.      Pharynx: No oropharyngeal exudate or posterior oropharyngeal erythema. Eyes:      Extraocular Movements: Extraocular movements intact. Conjunctiva/sclera: Conjunctivae normal.      Pupils: Pupils are equal, round, and reactive to light. Neck:      Vascular: No JVD. Trachea: No tracheal deviation. Cardiovascular:      Rate and Rhythm: Normal rate. Pulses: Normal pulses. Heart sounds: Normal heart sounds. No murmur heard. No friction rub. No gallop. Pulmonary:      Effort: Pulmonary effort is normal. No tachypnea, accessory muscle usage, respiratory distress or retractions. Breath sounds: No stridor. No wheezing, rhonchi or rales. Chest:      Chest wall: No tenderness. Abdominal:      General: Abdomen is flat. Bowel sounds are normal. There is no distension or abdominal bruit. Palpations: There is no shifting dullness, fluid wave, hepatomegaly, splenomegaly, mass or pulsatile mass. Tenderness: There is no abdominal tenderness. There is no right CVA tenderness, left CVA tenderness, guarding or rebound. Negative signs include Paz's sign, Rovsing's sign and McBurney's sign. Musculoskeletal:         General: No deformity. Cervical back: Normal range of motion and neck supple. No rigidity. Skin:     General: Skin is warm and dry. Capillary Refill: Capillary refill takes less than 2 seconds. Coloration: Skin is not jaundiced. Neurological:      General: No focal deficit present. Mental Status: She is alert and oriented to person, place, and time. Mental status is at baseline. Cranial Nerves: No cranial nerve deficit. Sensory: No sensory deficit. Motor: No weakness.       Coordination: Coordination normal.   Psychiatric:         Mood and Affect: Mood normal.      Comments: Patient very quiet, difficult to get her to speak or open up, states thoughts of wanting to die, with a plan of overdosing on pills, was stopped by her boyfriend prior to taking anything. DIAGNOSTIC RESULTS     EKG: All EKG's are interpreted by the Emergency Department Physician who either signs or Co-signsthis chart in the absence of a cardiologist.        RADIOLOGY:   [place] such as CT, Ultrasound and MRI are read by the radiologist. Plain radiographic images are visualized and preliminarily interpreted by the emergency physician with the below findings:        Interpretation per the Radiologist below, if available at the time ofthis note:    No orders to display         ED BEDSIDE ULTRASOUND:   Performed by ED Physician - none    LABS:  Labs Reviewed   ACETAMINOPHEN LEVEL - Abnormal; Notable for the following components:       Result Value    Acetaminophen Level <5 (*)     All other components within normal limits   COMPREHENSIVE METABOLIC PANEL - Abnormal; Notable for the following components: Total Protein 8.1 (*)     Albumin 4.9 (*)     Total Bilirubin 1.0 (*)     All other components within normal limits   LIPID PANEL - Abnormal; Notable for the following components:    HDL 63 (*)     All other components within normal limits   SALICYLATE LEVEL - Abnormal; Notable for the following components:    Salicylate, Serum <7.2 (*)     All other components within normal limits   URINALYSIS WITH REFLEX TO CULTURE - Abnormal; Notable for the following components:    Blood, Urine MODERATE (*)     Protein, UA TRACE (*)     Leukocyte Esterase, Urine TRACE (*)     All other components within normal limits   MICROSCOPIC URINALYSIS - Abnormal; Notable for the following components:    WBC, UA 6-9 (*)     RBC, UA 3-5 (*)     All other components within normal limits   COVID-19, RAPID   CBC WITH AUTO DIFFERENTIAL   CK   ETHANOL   PREGNANCY, URINE   TSH   URINE DRUG SCREEN       All other labs were within normal range or not returned as of this dictation.     EMERGENCY DEPARTMENT COURSE and DIFFERENTIAL DIAGNOSIS/MDM:   Vitals: Vitals:    01/07/23 0645 01/07/23 1203 01/07/23 1615   BP: 116/74 112/82 114/73   Pulse: (!) 104 89 94   Resp: 18 16 16   Temp: 99 °F (37.2 °C) 98.2 °F (36.8 °C) 98.4 °F (36.9 °C)   TempSrc:  Oral Oral   SpO2: 99%  98%   Weight: 130 lb (59 kg)     Height: 5' 7\" (1.702 m)              MDM  Number of Diagnoses or Management Options  Acute alcoholic intoxication without complication (HCC)  Depression with suicidal ideation  Diagnosis management comments: Pt is medically clear for psychiatric evaluation    Present emerged part complaint of depression, suicidal thoughts, stating that she has been upset, and depressed since the death of her father approximately 1 month ago, she locked her self in bathroom at home, and was can overdose on medications, her boyfriend broke the door down took her to the [facility], she was transported to the ED for further evaluation. Patient is in need of further assistance, intervention for suicidal ideation with plan. Patient will be admitted the hospital with the diagnosis of depression and suicidal ideation. CRITICAL CARE TIME   Total Critical Care time was 0 minutes, excluding separately reportableprocedures. There was a high probability of clinicallysignificant/life threatening deterioration in the patient's condition which required my urgent intervention. CONSULTS:  IP CONSULT TO HOSPITALIST  IP CONSULT TO SOCIAL WORK    PROCEDURES:  Unless otherwise noted below, none     Procedures    FINAL IMPRESSION      1. Depression with suicidal ideation    2. Acute alcoholic intoxication without complication (HCC)          DISPOSITION/PLAN   DISPOSITION        PATIENT REFERRED TO:  No follow-up provider specified.     DISCHARGE MEDICATIONS:  Current Discharge Medication List             (Please note that portions of this note were completed with a voice recognition program.  Efforts were made to edit the dictations but occasionally words are mis-transcribed.)    Gianna Glass Jaskaran Weinberg (electronically signed)  Attending Emergency Physician         Alejandra Sparks PA-C  01/08/23 0723
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
## 2023-01-07 NOTE — ED TRIAGE NOTES
Patient to ED via walk in for c/o SI w/plan (won't share). Patient recently lost father 1 month ago. Patient tearful, soft spoken, with poor eye contact in triage. Skin p/w/d. Respirations even and unlabored. No acute distress noted at this time.

## 2023-01-07 NOTE — CONSULTS
HISTORY AND PHYSICAL             Date: 2023        Patient Name: Eliz Anne     YOB: 1995      Age:  24 y.o. Chief Complaint     Chief Complaint   Patient presents with    Suicidal     With plan (won't share plan)          History Obtained From   patient    History of Present Illness   Patient is a 82-year-old female who presented to the ED department with complaints of depression since the death of her father on 2022. For EMR patient had barricaded self in bathroom and was thinking about overdosing on some pills that she had when her friend broke down the door and took her to the [facility] who then advised patient to come to ED. Patient has no psych hx, and  gestational hypertension. She reports history of migraines in which she took medication for but no longer takes. Denies current migraine. Patient also denies tobacco use, illicit drug, alcohol use and self-inflicted injuries. Past Medical History     Past Medical History:   Diagnosis Date    Depression, unspecified depression type 2023    Gestational hypertension, third trimester      uterine contractions in third trimester, antepartum 10/5/2016        Past Surgical History     Past Surgical History:   Procedure Laterality Date     SECTION          Medications Prior to Admission     Prior to Admission medications    Medication Sig Start Date End Date Taking? Authorizing Provider   propranolol (INDERAL LA) 80 MG extended release capsule Take 2 capsules by mouth daily 10/21/22   Historical Provider, MD        Allergies   Patient has no known allergies.     Social History     Social History       Tobacco History       Smoking Status  Every Day Smoking Tobacco Type  Cigars      Smokeless Tobacco Use  Never      Tobacco Comments  black n milds              Alcohol History       Alcohol Use Status  Yes Comment  occas              Drug Use       Drug Use Status  No              Sexual Activity Sexually Active  Yes Partners  Male                    Family History     Family History   Problem Relation Age of Onset    Diabetes Father        Review of Systems   12 point ROS reviewed and negative except as stated in HPI    Physical Exam   /82   Pulse 89   Temp 98.2 °F (36.8 °C) (Oral)   Resp 16   Ht 5' 7\" (1.702 m)   Wt 130 lb (59 kg)   SpO2 99%   BMI 20.36 kg/m²     CONSTITUTIONAL:  awake, alert, cooperative, no apparent distress, and appears stated age  EYES:  Lids and lashes normal, pupils equal, round and reactive to light, extra ocular muscles intact, sclera clear, conjunctiva normal  ENT:  Normocephalic, without obvious abnormality, atraumatic, sinuses nontender on palpation, external ears without lesions, oral pharynx with moist mucus membranes, tonsils without erythema or exudates, gums normal and good dentition. NECK:  Supple, symmetrical, trachea midline, no adenopathy, thyroid symmetric, not enlarged and no tenderness, skin normal  LUNGS:  No increased work of breathing, good air exchange, clear to auscultation bilaterally, no crackles or wheezing  CARDIOVASCULAR:  Normal apical impulse, regular rate and rhythm, normal S1 and S2, no S3 or S4, and no murmur noted  ABDOMEN:  No scars, normal bowel sounds, soft, non-distended, non-tender, no masses palpated, no hepatosplenomegally  MUSCULOSKELETAL:  There is no redness, warmth, or swelling of the joints. Full range of motion noted. Motor strength is 5 out of 5 all extremities bilaterally. Tone is normal.  NEUROLOGIC:  Awake, alert, oriented to name, place and time. Cranial nerves II-XII are grossly intact. Motor is 5 out of 5 bilaterally. Cerebellar finger to nose, heel to shin intact. Sensory is intact.   Babinski down going, Romberg negative, and gait is normal.  SKIN:  no bruising or bleeding, normal skin color, texture, turgor, no redness, warmth, or swelling, no rashes, and no lesions    Labs      Recent Results (from the past 24 hour(s))   Acetaminophen Level    Collection Time: 01/07/23  7:04 AM   Result Value Ref Range    Acetaminophen Level <5 (L) 10 - 30 ug/mL   CBC with Auto Differential    Collection Time: 01/07/23  7:04 AM   Result Value Ref Range    WBC 6.1 4.8 - 10.8 K/uL    RBC 4.53 4.20 - 5.40 M/uL    Hemoglobin 14.1 12.0 - 16.0 g/dL    Hematocrit 42.2 37.0 - 47.0 %    MCV 93.1 79.4 - 94.8 fL    MCH 31.1 27.0 - 31.3 pg    MCHC 33.4 33.0 - 37.0 %    RDW 13.9 11.5 - 14.5 %    Platelets 164 687 - 909 K/uL    Neutrophils % 53.7 %    Lymphocytes % 38.5 %    Monocytes % 6.5 %    Eosinophils % 0.4 %    Basophils % 0.9 %    Neutrophils Absolute 3.3 1.4 - 6.5 K/uL    Lymphocytes Absolute 2.3 1.0 - 4.8 K/uL    Monocytes Absolute 0.4 0.2 - 0.8 K/uL    Eosinophils Absolute 0.0 0.0 - 0.7 K/uL    Basophils Absolute 0.1 0.0 - 0.2 K/uL   CK    Collection Time: 01/07/23  7:04 AM   Result Value Ref Range    Total  0 - 170 U/L   Comprehensive Metabolic Panel    Collection Time: 01/07/23  7:04 AM   Result Value Ref Range    Sodium 142 135 - 144 mEq/L    Potassium 4.0 3.4 - 4.9 mEq/L    Chloride 107 95 - 107 mEq/L    CO2 23 20 - 31 mEq/L    Anion Gap 12 9 - 15 mEq/L    Glucose 98 70 - 99 mg/dL    BUN 7 6 - 20 mg/dL    Creatinine 0.53 0.50 - 0.90 mg/dL    Est, Glom Filt Rate >60.0 >60    Calcium 9.4 8.5 - 9.9 mg/dL    Total Protein 8.1 (H) 6.3 - 8.0 g/dL    Albumin 4.9 (H) 3.5 - 4.6 g/dL    Total Bilirubin 1.0 (H) 0.2 - 0.7 mg/dL    Alkaline Phosphatase 90 40 - 130 U/L    ALT 18 0 - 33 U/L    AST 20 0 - 35 U/L    Globulin 3.2 2.3 - 3.5 g/dL   Ethanol    Collection Time: 01/07/23  7:04 AM   Result Value Ref Range    Ethanol Lvl 93 mg/dL    Ethanol percent 0.082 G/dL   Lipid Panel    Collection Time: 01/07/23  7:04 AM   Result Value Ref Range    Cholesterol, Total 166 0 - 199 mg/dL    Triglycerides 66 0 - 150 mg/dL    HDL 63 (H) 40 - 59 mg/dL    LDL Calculated 90 0 - 351 mg/dL   Salicylate    Collection Time: 01/07/23  7:04 AM   Result Value Ref Range    Salicylate, Serum <7.2 (L) 15.0 - 30.0 mg/dL   TSH    Collection Time: 01/07/23  7:04 AM   Result Value Ref Range    TSH 1.300 0.440 - 3.860 uIU/mL   COVID-19, Rapid    Collection Time: 01/07/23  7:19 AM    Specimen: Nasopharyngeal Swab   Result Value Ref Range    SARS-CoV-2, NAAT Not Detected Not Detected   Pregnancy, Urine    Collection Time: 01/07/23  7:21 AM   Result Value Ref Range    HCG(Urine) Pregnancy Test Negative Detects HCG level >20 MIU/mL   Urine Drug Screen    Collection Time: 01/07/23  7:21 AM   Result Value Ref Range    Amphetamine Screen, Urine Neg Negative <1000 ng/mL    Barbiturate Screen, Ur Neg Negative < 200 ng/mL    Benzodiazepine Screen, Urine Neg Negative < 200 ng/mL    Cannabinoid Scrn, Ur Neg Negative < 50 ng/mL    Cocaine Metabolite Screen, Urine Neg Negative < 300 ng/mL    Opiate Scrn, Ur Neg Negative < 300 ng/mL    PCP Screen, Urine Neg Negative < 25 ng/mL    Methadone Screen, Urine Neg Negative <300 ng/mL    Propoxyphene Scrn, Ur Neg Negative <300 ng/mL    Oxycodone Urine Neg Negative <100 ng/mL    FENTANYL SCREEN, URINE Neg Negative < 50 ng/mL    Drug Screen Comment: see below    Urinalysis with Reflex to Culture    Collection Time: 01/07/23  7:21 AM    Specimen: Urine   Result Value Ref Range    Color, UA Yellow Straw/Yellow    Clarity, UA Clear Clear    Glucose, Ur Negative Negative mg/dL    Bilirubin Urine Negative Negative    Ketones, Urine Negative Negative mg/dL    Specific Gravity, UA 1.018 1.005 - 1.030    Blood, Urine MODERATE (A) Negative    pH, UA 6.5 5.0 - 9.0    Protein, UA TRACE (A) Negative mg/dL    Urobilinogen, Urine 1.0 <2.0 E.U./dL    Nitrite, Urine Negative Negative    Leukocyte Esterase, Urine TRACE (A) Negative    Urine Reflex to Culture Not Indicated    Microscopic Urinalysis    Collection Time: 01/07/23  7:21 AM   Result Value Ref Range    Bacteria, UA Negative Negative /HPF    Hyaline Casts, UA 1-3 0 - 5 /HPF    WBC, UA 6-9 (A) 0 - 5 /HPF    RBC, UA 3-5 (A) 0 - 5 /HPF    Epithelial Cells, UA 3-5 0 - 5 /HPF        Imaging/Diagnostics Last 24 Hours   No results found. Assessment      Hospital Problems             Last Modified POA    * (Principal) Depression, unspecified depression type 1/7/2023 Yes       Plan   *Depression  *Migraines  *gestational HTN    Patient medically appropriate for psych services per Kelley Bass. Prior home medications reviewed; will resume as appropriate. Migraines; patient denies taking medication and voices she has not had migraines recently. Gestational diabetes resolved current pregnancy test negative 1/7/23. Plan of care reviewed with patient. Please consult for medical needs as necessary.   Time spent 35 minutes    Consultations Ordered:  IP CONSULT TO HOSPITALIST  IP CONSULT TO SOCIAL WORK    Electronically signed by MICHELLE Pack CNP on 1/7/23 at 12:38 PM EST

## 2023-01-07 NOTE — ED NOTES
Pt received her breakfast at the bedside, pt is quiet and cooperative     Haim Sheth, RN  01/07/23 6718

## 2023-01-08 PROCEDURE — 1240000000 HC EMOTIONAL WELLNESS R&B

## 2023-01-08 PROCEDURE — 6370000000 HC RX 637 (ALT 250 FOR IP): Performed by: REGISTERED NURSE

## 2023-01-08 PROCEDURE — 6370000000 HC RX 637 (ALT 250 FOR IP): Performed by: PSYCHIATRY & NEUROLOGY

## 2023-01-08 RX ORDER — DIAPER,BRIEF,INFANT-TODD,DISP
EACH MISCELLANEOUS 2 TIMES DAILY
Status: DISCONTINUED | OUTPATIENT
Start: 2023-01-08 | End: 2023-01-11 | Stop reason: HOSPADM

## 2023-01-08 RX ADMIN — MIRTAZAPINE 15 MG: 15 TABLET, FILM COATED ORAL at 21:14

## 2023-01-08 RX ADMIN — HYDROCORTISONE: 1 CREAM TOPICAL at 21:26

## 2023-01-08 NOTE — CARE COORDINATION
Group Therapy Note    Date: 1/8/2023  Start Time: 1100  End Time:  8506    Number of Participants: 9    Type of Group: Psychotherapy    Patient's Goal:  To participate in a goal oriented group. Notes: Patient declined to attend psychoeducation group at 1100 despite encouragement by staff.      Discipline Responsible: /Counselor    ARAMIS Gonzalez

## 2023-01-08 NOTE — PROGRESS NOTES
Pt seen resting in bed and utilizing telephone often this shift. Minimal interaction with peers. Flat affect. Guarded and evasive at times during interview. Explains is now feeling better after starting medications and having \"time away\". Reports having a good support system. Plans to work with the [address] [place] [address] post D/C. Denies any SI, HI or AVH. Rates depression and anxiety 4/10. Remains pleasant and cooperative.

## 2023-01-08 NOTE — CARE COORDINATION
Brief Intervention and Referral to Treatment Summary    Patient was provided PHQ-9, AUDIT-C and DAST Screening:      PHQ-9 Score: 19  AUDIT-C Score: 5   DAST Score: 0     Patients substance use is considered     Low Risk/Healthy x  Moderate Risk  Harmful  Dependent    Patients depression is considered:     Minimal  Mild   Moderate  Moderately Severe x  Severe    Brief Education Was Provided    Patient was receptive  Patient was not receptive x      Brief Intervention Is Provided (Only for AUDIT-C or DAST)     Patient reports readiness to decrease and/or stop use and a plan was discussed   Patient denies readiness to decrease and/or stop use and a plan was not discussed x      Recommendations/Referrals for Brief and/or Specialized Treatment Provided to Patient    Patient does not believe she has any issues with drugs or alcohol at this time. As a result, no brief education or intervention was completed.     Electronically signed by Anibal Pardo on 1/8/2023 at 10:10 AM

## 2023-01-08 NOTE — PROGRESS NOTES
Pt. refused to attend the 1000 skills group, despite staff encouragement.  Electronically signed by Hammad Peña, [address] on 1/8/2023 at 12:27 PM

## 2023-01-08 NOTE — PROGRESS NOTES
Pt. declined to attend the 0900 community meeting, despite staff encouragement. Goal - \"To go home\" Electronically signed by NIECY Estrada on 1/8/2023 at 12:26 PM

## 2023-01-08 NOTE — PROGRESS NOTES
Patient had a flat affect but she was pleasant, cooperative and agreeable to do the leisure assessment. Patient stated she has been depressed since her father passed away in Dec. 2022. 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 Patient's boyfriend took her to the [facility] because she barricaded herself in the bathroom and was going to overdose on blood pressure pills. Patient's boyfriend broke the door down and stopped her. She denies any auditory or visual hallucinations. She denies using drugs and drinks alcohol occasionally. She has a good support system. She has poor sleep and low appetite. She enjoys watching TV with her kids. Resource folder given.  Electronically signed by Rm Dewey, [address] on 1/8/2023 at 9:41 AM

## 2023-01-08 NOTE — GROUP NOTE
Group Therapy Note    Date: 1/8/2023    Group Start Time: 1600  Group End Time: 36  Group Topic: Education Group - Inpatient    [facility]    Nathan Herrera RN        Group Therapy Note    Attendees: 12/23         Status After Intervention:  Unchanged    Participation Level:  Active Listener    Participation Quality: Appropriate      Speech:  normal      Thought Process/Content: Logical      Affective Functioning: Congruent      Mood: depressed      Level of consciousness:  Alert and Oriented x4      Response to Learning: Capable of insight      Endings: None Reported    Modes of Intervention: Education      Discipline Responsible: Registered Nurse      Signature:  Nathan Herrera RN

## 2023-01-08 NOTE — CARE COORDINATION
B[facility] Biopsychosocial Assessment    Current Level of Psychosocial Functioning     Independent   Dependent    Minimal Assist x    Comments:  Patient is a mother of four young children. She is employed and receives [employer] coverage for her family. Psychosocial High Risk Factors (check all that apply)    Unable to obtain meds   Chronic illness/pain    Substance abuse x (low risk alcohol)  Lack of Family Support x  Financial stress   Isolation   Inadequate Community Resources x  Suicide attempt(s)  Not taking medications x  Victim of crime   Developmental Delay  Unable to manage personal needs    Age 72 or older   Homeless  No transportation   Readmission within 30 days  Unemployment  Traumatic Event    Comments: Patient has at least four high risk factors associated with this admission. Psychiatric Advanced Directives: None Reported. Family to Involve in Treatment: Patient provided her boyfriend's contact information to complete collateral.     Sexual Orientation:  Patient is currently in a heterosexual relationship. Patient Strengths: Patient enjoys spending time with her children. Patient Barriers: Patient does not have a community mental health provider. Opiate Education Provided:  N/A    CMHC/mental health history: None Reported. Plan of Care   medication management, group/individual therapies, family meetings, psycho -education, treatment team meetings to assist with stabilization    Initial Discharge Plan:  Patient will return home and follow the recommendations of the treatment team.      Clinical Summary:    Patient is a 24year old female who was admitted to the [facility] due to voicing thoughts of suicide. Reportedly, patient has had an ongoing complaint of depression since the death of her father on 12/18/2022. When interviewed, patient was forthcoming about her previous plan to overdose in an attempt on her own life.  She shared, since this admission, she no longer has thoughts, intent, or a plan to commit suicide. Patient believes her depression and thoughts of self harm are connected to the recent death of her father. She seems to believe her depression and wish to be dead is a reasonable response to the death of a loved one. Patient does not have a community mental health provider and is not being prescribed mental health medication. She was willing to complete a safety plan but stated she does not have ongoing issues with coping. Patient was cooperative and engaging the entire session.     Electronically signed by Brandee Crain on 1/8/2023 at 10:20 AM

## 2023-01-08 NOTE — CARE COORDINATION
FAMILY COLLATERAL NOTE    Family/Support Name: Ale Delarosa #: [phone]  Relationship to Pt[de-identified] boyfriend         Family/Support contact aware of hospitalization: Yes. Boyfriend stated he was the one who brought patient to the ER. Presenting Symptoms/Current Concerns:  Depressive Episodes  Isolation  Angry Outbursts  Insomnia    Top 3 Life Stressors:   Death of her father  Everyday life stressors  Chronic medical concerns    Background History Relevant to Current Hospitalization:  Per boyfriend, patient has not been in the right state of mind since the death of her father. Hyperventilating and isolating. Lashing out at everyone and very angry. Not sleeping and not wanting to leave the house. 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 Boyfriend feels patient's work is a trigger for her. She has been hiding her grief and loss from family members. Patient finally asked boyfriend to bring her to the emergency room. Family has been encouraging to express her feelings. She just holds it inside or presents with anger and many symptoms of depression. Family Mental Health/Substance Use History:   Unknown. Support Network's Goal for Hospitalization:   Patient needs to be strongly encouraged to talk about her depression, grief, and loss. It seems to be effecting every aspect of her life. May need depression medication to help her get through the loss. Discharge Plan:   Patient lives with her boyfriend. She will return to her current living situation and follow the recommendations of the treatment team.     Support Network Supportive of Discharge Plan:   Yes. Support can confirm Safety of Location and Security of Weapons:   Per boyfriend, there are no weapons at the discharge location.      Support agreeable to Safeguard and Monitor Medications (including Prescription and OTC):   Yes. Boyfriend is agreeable to safeguard and secure medication. Identified Barriers to Compliance with Discharge Plan:   None Identified. Recommendations for Support Network:   Please be available for follow up from  if necessary.        ARAMIS Vivar

## 2023-01-08 NOTE — PROGRESS NOTES
Patient denies S/I, H/I and AVH at this time. Patient presents guarded and flat. Patient denies depression and anxiety at this time and reports only \"feeling down\". Patient has been seen out on unit and talking on the phone. Patient has been friendly with staff and peers. Patient is able to make needs known. Nursing encouraged patient to express her feelings to nursing staff. Patient verbalize understanding. Will continue to monitor.    Electronically signed by Fermín Flores LPN on 8/0/6316 at 4:69 PM

## 2023-01-09 PROCEDURE — 1240000000 HC EMOTIONAL WELLNESS R&B

## 2023-01-09 PROCEDURE — 6370000000 HC RX 637 (ALT 250 FOR IP): Performed by: PSYCHIATRY & NEUROLOGY

## 2023-01-09 RX ADMIN — HYDROCORTISONE: 1 CREAM TOPICAL at 08:53

## 2023-01-09 RX ADMIN — MIRTAZAPINE 15 MG: 15 TABLET, FILM COATED ORAL at 21:34

## 2023-01-09 RX ADMIN — HYDROCORTISONE: 1 CREAM TOPICAL at 21:34

## 2023-01-09 NOTE — PROGRESS NOTES
Pt. refused to attend the 1000 skills group, despite staff encouragement.   Electronically signed by Amelia Milligan on 1/9/2023 at 11:38 AM

## 2023-01-09 NOTE — PLAN OF CARE
Pt seen resting in bed and utilizing telephone often this shift. When in day room, sits on periphery. Minimal interaction with peers. Flat affect. Guarded and evasive at times during interview. Explains she is now feeling better after starting medications and having \"time away\". Reports having a good support system to help her while she continues to grieve the loss of her father. Plans to work with the [facility] [employer] post D/C. Denies any SI, HI or AVH. Rates depression and anxiety 4/10. Remains pleasant and cooperative    Problem: Self Harm/Suicidality  Goal: Will have no self-injury during hospital stay  Description: INTERVENTIONS:  1. Ensure constant observer at bedside with Q15M safety checks  2. Maintain a safe environment  3. Secure patient belongings  4. Ensure family/visitors adhere to safety recommendations  5. Ensure safety tray has been added to patient's diet order  6. Every shift and PRN: Re-assess suicidal risk via Frequent Screener    1/8/2023 1951 by Malachi Hernandez RN  Outcome: Progressing  1/8/2023 1106 by Sarthak Ventura RN  Outcome: Progressing  Flowsheets (Taken 1/8/2023 1058)  Will have no self-injury during hospital stay:   Ensure constant observer at bedside with Q15M safety checks   Maintain a safe environment   Secure patient belongings   Ensure family/visitors adhere to safety recommendations   Ensure safety tray has been added to patient's diet order   Every shift and PRN: Re-assess suicidal risk via Frequent Screener     Problem: Pain  Goal: Verbalizes/displays adequate comfort level or baseline comfort level  1/8/2023 1951 by Malachi Hernandez RN  Outcome: Progressing  1/8/2023 1106 by Sarthak Ventura RN  Outcome: Progressing     Problem: Anxiety  Goal: Will report anxiety at manageable levels  Description: INTERVENTIONS:  1. Administer medication as ordered  2. Teach and rehearse alternative coping skills  3.  Provide emotional support with 1:1 interaction with staff  1/8/2023 1951 by Prabhu Goss RN  Outcome: Progressing  1/8/2023 1106 by Lois Presley RN  Outcome: Progressing  Flowsheets (Taken 1/8/2023 1058)  Will report anxiety at manageable levels:   Administer medication as ordered   Provide emotional support with 1:1 interaction with staff   Teach and rehearse alternative coping skills     Problem: Coping  Goal: Pt/Family able to verbalize concerns and demonstrate effective coping strategies  Description: INTERVENTIONS:  1. Assist patient/family to identify coping skills, available support systems and cultural and spiritual values  2. Provide emotional support, including active listening and acknowledgement of concerns of patient and caregivers  3. Reduce environmental stimuli, as able  4. Instruct patient/family in relaxation techniques, as appropriate  5. Assess for spiritual pain/suffering and initiate Spiritual Care, Psychosocial Clinical Specialist consults as needed  1/8/2023 1951 by Prabhu Goss RN  Outcome: Progressing  1/8/2023 1106 by Lois Presley RN  Outcome: Progressing  Flowsheets (Taken 1/8/2023 1058)  Patient/family able to verbalize anxieties, fears, and concerns, and demonstrate effective coping:   Assist patient/family to identify coping skills, available support systems and cultural and spiritual values   Provide emotional support, including active listening and acknowledgement of concerns of patient and caregivers   Instruct patient/family in relaxation techniques, as appropriate   Reduce environmental stimuli, as able   Assess for spiritual pain/suffering and initiate Spiritual Care, Psychosocial Clinical Specialist consults as needed     Problem: Death & Dying  Goal: Pt/Family communicate acceptance of impending death and feel psychological comfort and peace  Description: INTERVENTIONS:  1. Assess patient/family anxiety and grief process related to end of life issues  2. Provide emotional and spiritual support  3.  Provide information about the patient's health status with consideration of family and cultural values  4. Communicate willingness to discuss death and facilitate grief process  with patient/family as appropriate  5. Emphasize sustaining relationships within family system and community, or rose/spiritual traditions  6. Initiate Spiritual Care, Psychosocial Clinical Specialist, consult as needed  1/8/2023 1951 by Annette Steward RN  Outcome: Progressing  1/8/2023 1106 by Alyssa Duffy RN  Outcome: Progressing  Flowsheets (Taken 1/8/2023 1058)  Patient/family communicates acceptance of loss or impending death and feels physical/psychological comfort and peace:   Assess patient/family anxiety and grief process related to end of life issues   Provide emotional and spiritual support   Provide information about the patients health status with consideration of family and cultural values   Emphasize sustaining relationships within family system and community, or rose/spiritual traditions   Communicate willingness to discuss death and facilitate grief process  with patient/family as appropriate   Initiate Spiritual Care, Psychosocial Clinical Specialist, consult as needed     Problem: Decision Making  Goal: Pt/Family able to effectively weigh alternatives and participate in decision making related to treatment and care  Description: INTERVENTIONS:  1. Determine when there are differences between patient's view, family's view, and healthcare provider's view of condition  2. Facilitate patient and family articulation of goals for care  3. Help patient and family identify pros/cons of alternative solutions  4. Provide information as requested by patient/family  5. Respect patient/family right to receive or not to receive information  6. Serve as a liaison between patient and family and health care team  7.  Initiate Consults from Ethics, Palliative Care or initiate [address] as is appropriate  1/8/2023 1949 by Annette Steward RN  Outcome: Progressing  1/8/2023 1106 by Aleshia Restrepo RN  Outcome: Progressing  Flowsheets (Taken 1/8/2023 1058)  Patient/family able to effectively weigh alternatives and participate in decision making related to treatment and care:   Determine when there are differences between patient's view, family's view, and healthcare provider's view of condition   Facilitate patient and family articulation of goals for care   Help patient and family identify pros/cons of alternative solutions   Respect patient/family right to receive or not to receive information   Provide information as requested by patient/family   Serve as a liaison between patient and family and health care team   Initiate Consults from Ethics, Palliative Care or initiate [address] [facility] as is appropriate     Problem: Depression/Self Harm  Goal: Effect of psychiatric condition will be minimized and patient will be protected from self harm  Description: INTERVENTIONS:  1. Assess impact of patient's symptoms on level of functioning, self care needs and offer support as indicated  2. Assess patient/family knowledge of depression, impact on illness and need for teaching  3. Provide emotional support, presence and reassurance  4. Assess for possible suicidal thoughts or ideation. If patient expresses suicidal thoughts or statements do not leave alone, initiate Suicide Precautions, move to a room close to the nursing station and obtain sitter  5.  Initiate consults as appropriate with Mental Health Professional, Spiritual Care, Psychosocial CNS, and consider a recommendation to the LIP for a Psychiatric Consultation  1/8/2023 1951 by Patrick Melton RN  Outcome: Progressing  1/8/2023 1106 by Aleshia Restrepo RN  Outcome: Progressing  Flowsheets (Taken 1/8/2023 1058)  Effect of psychiatric condition will be minimized and patient will be protected from self harm:   Assess impact of patients symptoms on level of functioning, self care needs and offer support as indicated   Assess patient/family knowledge of depression, impact on illness and need for teaching   Assess for suicidal thoughts or ideation. If patient expresses suicidal thoughts or statements do not leave alone, initiate Suicide Precautions, move near nurse station, obtain sitter   Provide emotional support, presence and reassurance   Initiate consults as appropriate with Mental Health Professional, Spiritual Care, Psychosocial CNS, and consider a recommendation to the LIP for a Psychiatric Consultation     Problem: Involuntary Admit  Goal: Will cooperate with staff recommendations and doctor's orders and will demonstrate appropriate behavior  Description: INTERVENTIONS:  1. Treat underlying conditions and offer medication as ordered  2. Educate regarding involuntary admission procedures and rules  3.  Contain excessive/inappropriate behavior per unit and hospital policies  9/1/7777 [id number] by Dami Martins RN  Outcome: Progressing  1/8/2023 1106 by Courtney Pascual RN  Outcome: Tarik Martinez (Taken 1/8/2023 1058)  Will cooperate with staff recommendations and doctor's orders and will demonstrate appropriate behavior:   Treat underlying conditions and offer medication as ordered   Contain excessive/inappropriate behavior per unit and hospital policies   Educate regarding involuntary admission procedures and rules     Problem: Risk for Elopement  Goal: Patient will not exit the unit/facility without proper excort  1/8/2023 1951 by Dami Martins RN  Outcome: Progressing  1/8/2023 1106 by Courtney Pascual RN  Outcome: Progressing  Flowsheets (Taken 1/8/2023 1058)  Nursing Interventions for Elopement Risk:   Reduce environmental triggers   Make sure patient has all necessary personal care items

## 2023-01-09 NOTE — PROGRESS NOTES
Pt. declined to attend the 0900 community meeting, despite staff encouragement.   GOAL : \" to go home\" Electronically signed by Carmen Moreno on 1/9/2023 at 9:53 AM

## 2023-01-09 NOTE — CARE COORDINATION
Group Therapy Note    Date: 1/9/2023  Start Time: 1300  End Time:  1340    Number of Participants: 9    Type of Group: Cognitive Skills    Patient's Goal:  To participate in cognitive skills group. Notes: Patient declined to attend psychoeducation group at 1300 despite encouragement by staff.      Discipline Responsible: /Counselor    ARAMIS Avila

## 2023-01-09 NOTE — PROGRESS NOTES
Pt isolating to room, irritable approach. Pt evasive,would not engage in conversation. Pt answer most questions with one or two word responses. Pt voiced, repetitiveness, answering the same questions, over and over. Pt education, assessments, emotional support. Pt reports showering yesterday. Pt reports good appetite. Pt reports good sleep. Pt rates anxiety 0/10. Pt rates depression 8/10. On a scale from 1 through 10, 10 being the highest. Pt voiced, dealing with the death of her father, very difficult. Pt denies SI, hesitant, @ first, voiced, \"no,\"  denies HI and A/V hallucinations. Will continue to monitor.

## 2023-01-09 NOTE — PROGRESS NOTES
Patient did not attend group despite staff encouragement.  Electronically signed by William Ricks on 1/9/2023 at 3:08 PM

## 2023-01-09 NOTE — PROGRESS NOTES
[facility]. FOLLOW-UP NOTE       2023     Patient was seen and examined in person, Chart reviewed   Patient's case discussed with staff/team    Chief Complaint: depressed, SI    Interim History:     Brighter  Depressed 2/10, denies anxiety  Improving sleep; patient reports sleeping all day; feels like se playing catch up  Isolative, has not attended any groups yet  Increased appetite  Denies SI HI AVH; no delusions present  Pt reports her father passed 3 weeks ago; reports never struggling with depression prior to his passing; states she had not slept in appx 3 weeks     Appetite:   [] Normal/Unchanged  [x] Increased  [] Decreased      Sleep:       [x] Normal/Unchanged  [] Fair       [] Poor              Energy:    [x] Normal/Unchanged  [] Increased  [] Decreased        SI [] Present  [x] Absent    HI  []Present  [x] Absent     Aggression:  [] yes  [x] no    Patient is [x] able  [] unable to CONTRACT FOR SAFETY     PAST MEDICAL/PSYCHIATRIC HISTORY:   Past Medical History:   Diagnosis Date    Depression, unspecified depression type 2023    Gestational hypertension, third trimester      uterine contractions in third trimester, antepartum 10/5/2016       FAMILY/SOCIAL HISTORY:  Family History   Problem Relation Age of Onset    Diabetes Father      Social History     Socioeconomic History    Marital status: Single     Spouse name: Not on file    Number of children: Not on file    Years of education: Not on file    Highest education level: Not on file   Occupational History    Not on file   Tobacco Use    Smoking status: Every Day     Types: Cigars    Smokeless tobacco: Never    Tobacco comments:     black n milds   Vaping Use    Vaping Use: Never used   Substance and Sexual Activity    Alcohol use: Yes     Comment: occas    Drug use: No    Sexual activity: Yes     Partners: Male   Other Topics Concern    Not on file   Social History Narrative    Not on file Social Determinants of Health     Financial Resource Strain: Not on file   Food Insecurity: Not on file   Transportation Needs: Not on file   Physical Activity: Not on file   Stress: Not on file   Social Connections: Not on file   Intimate Partner Violence: Not on file   Housing Stability: Not on file           ROS:  [x] All negative/unchanged except if checked.  Explain positive(checked items) below:  [] Constitutional  [] Eyes  [] Ear/Nose/Mouth/Throat  [] Respiratory  [] CV  [] GI  []   [] Musculoskeletal  [] Skin/Breast  [] Neurological  [] Endocrine  [] Heme/Lymph  [] Allergic/Immunologic    Explanation:     MEDICATIONS:    Current Facility-Administered Medications:     hydrocortisone 1 % cream, , Topical, BID, MICHELLE Little - CNP, Given at 01/09/23 1911    acetaminophen (TYLENOL) tablet 650 mg, 650 mg, Oral, Q4H PRN, Arleth Hummel MD    magnesium hydroxide (MILK OF MAGNESIA) 400 MG/5ML suspension 30 mL, 30 mL, Oral, Daily PRN, Arleth Hummel MD    aluminum & magnesium hydroxide-simethicone (MAALOX) 200-200-20 MG/5ML suspension 30 mL, 30 mL, Oral, PRN, Arleth Hummel MD    haloperidol (HALDOL) tablet 5 mg, 5 mg, Oral, Q6H PRN **OR** haloperidol lactate (HALDOL) injection 5 mg, 5 mg, IntraMUSCular, Q6H PRN, Arleth Hummel MD    benztropine mesylate (COGENTIN) injection 2 mg, 2 mg, IntraMUSCular, BID PRN, Arleth Hummel MD    traZODone (DESYREL) tablet 50 mg, 50 mg, Oral, Nightly PRN, Arleth Hummel MD    hydrOXYzine (VISTARIL) injection 50 mg, 50 mg, IntraMUSCular, Q6H PRN **OR** hydrOXYzine pamoate (VISTARIL) capsule 50 mg, 50 mg, Oral, Q6H PRN, Arleth Hummel MD    mirtazapine (REMERON) tablet 15 mg, 15 mg, Oral, Nightly, Arleth Hummel MD, 15 mg at 01/08/23 2114      Examination:  /78   Pulse 92   Temp 98.1 °F (36.7 °C)   Resp 18   Ht 5' 7\" (1.702 m)   Wt 130 lb (59 kg)   SpO2 100%   BMI 20.36 kg/m²   Gait - steady  Medication side effects(SE): denies     Mental Status Examination:    Level of consciousness:  within normal limits   Appearance:  good grooming and good hygiene  Behavior/Motor:  no abnormalities noted  Attitude toward examiner:  cooperative, attentive, and good eye contact  Speech:  spontaneous, normal rate, and normal volume   Mood: euthymic  Affect:  mood congruent  Thought processes:  linear and goal directed   Thought content:  Suicidal Ideation:  denies suicidal ideation  Delusions:  no evidence of delusions  Perceptual Disturbance:  denies any perceptual disturbance  Cognition:  oriented to person, place, and time   Concentration intact  Insight fair   Judgement fair     ASSESSMENT:   Patient symptoms are:  [] Well controlled  [x] Improving  [] Worsening  [] No change      Diagnosis:   Principal Problem:    Depression, unspecified depression type  Resolved Problems:    * No resolved hospital problems. *      LABS:    Recent Labs     01/07/23  0704   WBC 6.1   HGB 14.1        Recent Labs     01/07/23  0704      K 4.0      CO2 23   BUN 7   CREATININE 0.53   GLUCOSE 98     Recent Labs     01/07/23  0704   BILITOT 1.0*   ALKPHOS 90   AST 20   ALT 18     Lab Results   Component Value Date/Time    LABAMPH Neg 01/07/2023 07:21 AM    BARBSCNU Neg 01/07/2023 07:21 AM    LABBENZ Neg 01/07/2023 07:21 AM    LABMETH Neg 01/07/2023 07:21 AM    OPIATESCREENURINE Neg 01/07/2023 07:21 AM    PHENCYCLIDINESCREENURINE Neg 01/07/2023 07:21 AM    ETOH 93 01/07/2023 07:04 AM     Lab Results   Component Value Date/Time    TSH 1.300 01/07/2023 07:04 AM     No results found for: LITHIUM  No results found for: VALPROATE, CBMZ    RISK ASSESSMENT: suicide high    Treatment Plan:  Reviewed current Medications with the patient. Monitoring efficacy and tolerability of medication regime   Risks, benefits, side effects, drug-to-drug interactions and alternatives to treatment were discussed.   Collateral information: boyfriend  CD evaluation denies  Encourage patient to attend group and other milieu activities. Discharge planning discussed with the patient and treatment team; tentative plan to DC tomorrow to home with OP services     PSYCHOTHERAPY/COUNSELING:  [x] Therapeutic interview  [x] Supportive  [] CBT  [] Ongoing  [] Other    [x] Patient continues to need, on a daily basis, active treatment furnished directly by or requiring the supervision of inpatient psychiatric personnel      Anticipated Length of stay: 2 days         COSIGN : yes     Electronically signed by MICHELLE Molina CNP on 1/9/2023 at 12:55 PM     Addendum:  Amauri Wiley seen with NP after attending the team meeting, discussing the patient with the nursing and social work staff. I participated during the interview process as well as the treatment plan and spent more than 70% of the time with the nurse practitioner helping me in documentation.   I agree with the above documentation of clinical finding and treatment plan    Physician Signature: Electronically signed by Soheila De Los Santos MD on 1/9/2023 at 4:23 PM

## 2023-01-09 NOTE — PROGRESS NOTES
[facility]. FOLLOW-UP NOTE       2023     Patient was seen and examined in person, Chart reviewed   Patient's case discussed with staff/team    Chief Complaint: Depressed SI    Interim History:     Pt still feel the same  Feel depressed hopeless feeling  Slept better last night  Passive SI  Appetite:   [] Normal/Unchanged  [] Increased  [x] Decreased      Sleep:       [] Normal/Unchanged  [] Fair       [x] Poor              Energy:    [] Normal/Unchanged  [] Increased  [x] Decreased        SI [x] Present  [] Absent    HI  []Present  [x] Absent     Aggression:  [] yes  [x] no    Patient is [] able  [x] unable to CONTRACT FOR SAFETY     PAST MEDICAL/PSYCHIATRIC HISTORY:   Past Medical History:   Diagnosis Date    Depression, unspecified depression type 2023    Gestational hypertension, third trimester      uterine contractions in third trimester, antepartum 10/5/2016       FAMILY/SOCIAL HISTORY:  Family History   Problem Relation Age of Onset    Diabetes Father      Social History     Socioeconomic History    Marital status: Single     Spouse name: Not on file    Number of children: Not on file    Years of education: Not on file    Highest education level: Not on file   Occupational History    Not on file   Tobacco Use    Smoking status: Every Day     Types: Cigars    Smokeless tobacco: Never    Tobacco comments:     black n milds   Vaping Use    Vaping Use: Never used   Substance and Sexual Activity    Alcohol use: Yes     Comment: occas    Drug use: No    Sexual activity: Yes     Partners: Male   Other Topics Concern    Not on file   Social History Narrative    Not on file     Social Determinants of Health     Financial Resource Strain: Not on file   Food Insecurity: Not on file   Transportation Needs: Not on file   Physical Activity: Not on file   Stress: Not on file   Social Connections: Not on file   Intimate Partner Violence: Not on file   Housing Stability: Not on file           ROS:  [x] All negative/unchanged except if checked.  Explain positive(checked items) below:  [] Constitutional  [] Eyes  [] Ear/Nose/Mouth/Throat  [] Respiratory  [] CV  [] GI  []   [] Musculoskeletal  [] Skin/Breast  [] Neurological  [] Endocrine  [] Heme/Lymph  [] Allergic/Immunologic    Explanation:     MEDICATIONS:    Current Facility-Administered Medications:     hydrocortisone 1 % cream, , Topical, BID, Julius Bella, APRN - CNP    acetaminophen (TYLENOL) tablet 650 mg, 650 mg, Oral, Q4H PRN, Jennifer Sanchez MD    magnesium hydroxide (MILK OF MAGNESIA) 400 MG/5ML suspension 30 mL, 30 mL, Oral, Daily PRN, Jennifer Sanchez MD    aluminum & magnesium hydroxide-simethicone (MAALOX) 200-200-20 MG/5ML suspension 30 mL, 30 mL, Oral, PRN, Jennifer Sanchez MD    haloperidol (HALDOL) tablet 5 mg, 5 mg, Oral, Q6H PRN **OR** haloperidol lactate (HALDOL) injection 5 mg, 5 mg, IntraMUSCular, Q6H PRN, Jennifer Sanchez MD    benztropine mesylate (COGENTIN) injection 2 mg, 2 mg, IntraMUSCular, BID PRN, Jennifer Sanchez MD    traZODone (DESYREL) tablet 50 mg, 50 mg, Oral, Nightly PRN, Jennifer Sanchez MD    hydrOXYzine (VISTARIL) injection 50 mg, 50 mg, IntraMUSCular, Q6H PRN **OR** hydrOXYzine pamoate (VISTARIL) capsule 50 mg, 50 mg, Oral, Q6H PRN, Jennifer Sanchez MD    mirtazapine (REMERON) tablet 15 mg, 15 mg, Oral, Nightly, Jennifer Sanchez MD, 15 mg at 01/07/23 2117      Examination:  /68   Pulse 89   Temp 98.6 °F (37 °C)   Resp 18   Ht 5' 7\" (1.702 m)   Wt 130 lb (59 kg)   SpO2 100%   BMI 20.36 kg/m²   Gait - steady  Medication side effects(SE): no    Mental Status Examination:    Level of consciousness:  within normal limits   Appearance:  fair grooming and fair hygiene  Behavior/Motor:  psychomotor retardation  Attitude toward examiner:  cooperative  Speech:  slow   Mood: depressed  Affect:  mood congruent  Thought processes:  slow   Thought content:  Suicidal Ideation:  denies suicidal ideation  Delusions:  no evidence of delusions  Perceptual Disturbance:  denies any perceptual disturbance  Cognition:  oriented to person, place, and time   Concentration poor  Insight poor   Judgement fair     ASSESSMENT:   Patient symptoms are:  [] Well controlled  [] Improving  [] Worsening  [] No change      Diagnosis:   MDD single episode  severe    LABS:    Recent Labs     01/07/23 0704   WBC 6.1   HGB 14.1        Recent Labs     01/07/23 0704      K 4.0      CO2 23   BUN 7   CREATININE 0.53   GLUCOSE 98     Recent Labs     01/07/23  0704   BILITOT 1.0*   ALKPHOS 90   AST 20   ALT 18     Lab Results   Component Value Date/Time    LABAMPH Neg 01/07/2023 07:21 AM    BARBSCNU Neg 01/07/2023 07:21 AM    LABBENZ Neg 01/07/2023 07:21 AM    LABMETH Neg 01/07/2023 07:21 AM    OPIATESCREENURINE Neg 01/07/2023 07:21 AM    PHENCYCLIDINESCREENURINE Neg 01/07/2023 07:21 AM    ETOH 93 01/07/2023 07:04 AM     Lab Results   Component Value Date/Time    TSH 1.300 01/07/2023 07:04 AM     No results found for: LITHIUM  No results found for: VALPROATE, CBMZ      Treatment Plan:  Reviewed current Medications with the patient. Medication as ordered  Risks, benefits, side effects, drug-to-drug interactions and alternatives to treatment were discussed. Collateral information:   CD evaluation  Encourage patient to attend group and other milieu activities.   Discharge planning discussed with the patient and treatment team.    PSYCHOTHERAPY/COUNSELING:  [x] Therapeutic interview  [x] Supportive  [] CBT  [] Ongoing  [] Other    [x] Patient continues to need, on a daily basis, active treatment furnished directly by or requiring the supervision of inpatient psychiatric personnel      Anticipated Length of stay:        COSIGN :     Electronically signed by James Peter MD on 1/8/2023 at 7:48 PM

## 2023-01-09 NOTE — PLAN OF CARE
Problem: Self Harm/Suicidality  Goal: Will have no self-injury during hospital stay  Description: INTERVENTIONS:  1. Ensure constant observer at bedside with Q15M safety checks  2. Maintain a safe environment  3. Secure patient belongings  4. Ensure family/visitors adhere to safety recommendations  5. Ensure safety tray has been added to patient's diet order  6. Every shift and PRN: Re-assess suicidal risk via Frequent Screener    Outcome: Progressing  Flowsheets (Taken 1/9/2023 1153)  Will have no self-injury during hospital stay:   Ensure constant observer at bedside with Q15M safety checks   Maintain a safe environment   Secure patient belongings   Ensure safety tray has been added to patient's diet order   Every shift and PRN: Re-assess suicidal risk via Frequent Screener     Problem: Pain  Goal: Verbalizes/displays adequate comfort level or baseline comfort level  Outcome: Progressing     Problem: Anxiety  Goal: Will report anxiety at manageable levels  Description: INTERVENTIONS:  1. Administer medication as ordered  2. Teach and rehearse alternative coping skills  3.  Provide emotional support with 1:1 interaction with staff  Outcome: Progressing  Flowsheets (Taken 1/9/2023 1153)  Will report anxiety at manageable levels:   Administer medication as ordered   Teach and rehearse alternative coping skills   Provide emotional support with 1:1 interaction with staff

## 2023-01-10 LAB
EKG ATRIAL RATE: 73 BPM
EKG P AXIS: 61 DEGREES
EKG P-R INTERVAL: 110 MS
EKG Q-T INTERVAL: 384 MS
EKG QRS DURATION: 90 MS
EKG QTC CALCULATION (BAZETT): 423 MS
EKG R AXIS: 61 DEGREES
EKG T AXIS: 41 DEGREES
EKG VENTRICULAR RATE: 73 BPM

## 2023-01-10 PROCEDURE — 1240000000 HC EMOTIONAL WELLNESS R&B

## 2023-01-10 PROCEDURE — 93010 ELECTROCARDIOGRAM REPORT: CPT | Performed by: INTERNAL MEDICINE

## 2023-01-10 PROCEDURE — 93005 ELECTROCARDIOGRAM TRACING: CPT | Performed by: PSYCHIATRY & NEUROLOGY

## 2023-01-10 PROCEDURE — 6370000000 HC RX 637 (ALT 250 FOR IP): Performed by: PSYCHIATRY & NEUROLOGY

## 2023-01-10 RX ADMIN — MIRTAZAPINE 15 MG: 15 TABLET, FILM COATED ORAL at 21:01

## 2023-01-10 NOTE — PROGRESS NOTES
[facility]. FOLLOW-UP NOTE       1/10/2023     Patient was seen and examined in person, Chart reviewed   Patient's case discussed with staff/team    Chief Complaint: depressed, SI    Interim History:     Brighter  Denies depression, anxiety, SI HI AVH; no delusions noted  Pt reports tolerating the medication well  Pt tearful throughout admission discussing her father, however reporting she feels she is finally acknowledging and accepting his passing. Pt reports feeling tears of happiness knowing he is no longer fighting his cancer abdi.    Sister strong support, reports speaking with her over the phone   Pt identifies several protective factors including her father wanting her to live life, her family, and children; pt reports she has a great life and hadn't struggled with depression prior to fathers passing      Appetite:   [x] Normal/Unchanged  [] Increased  [] Decreased      Sleep:       [x] Normal/Unchanged  [] Fair       [] Poor              Energy:    [x] Normal/Unchanged  [] Increased  [] Decreased        SI [] Present  [x] Absent    HI  []Present  [x] Absent     Aggression:  [] yes  [x] no    Patient is [x] able  [] unable to CONTRACT FOR SAFETY     PAST MEDICAL/PSYCHIATRIC HISTORY:   Past Medical History:   Diagnosis Date    Depression, unspecified depression type 2023    Gestational hypertension, third trimester      uterine contractions in third trimester, antepartum 10/5/2016       FAMILY/SOCIAL HISTORY:  Family History   Problem Relation Age of Onset    Diabetes Father      Social History     Socioeconomic History    Marital status: Single     Spouse name: Not on file    Number of children: Not on file    Years of education: Not on file    Highest education level: Not on file   Occupational History    Not on file   Tobacco Use    Smoking status: Every Day     Types: Cigars    Smokeless tobacco: Never    Tobacco comments:     black n milds   Vaping Use    Vaping Use: Never used   Substance and Sexual Activity    Alcohol use: Yes     Comment: occas    Drug use: No    Sexual activity: Yes     Partners: Male   Other Topics Concern    Not on file   Social History Narrative    Not on file     Social Determinants of Health     Financial Resource Strain: Not on file   Food Insecurity: Not on file   Transportation Needs: Not on file   Physical Activity: Not on file   Stress: Not on file   Social Connections: Not on file   Intimate Partner Violence: Not on file   Housing Stability: Not on file           ROS:  [x] All negative/unchanged except if checked.  Explain positive(checked items) below:  [] Constitutional  [] Eyes  [] Ear/Nose/Mouth/Throat  [] Respiratory  [] CV  [] GI  []   [] Musculoskeletal  [] Skin/Breast  [] Neurological  [] Endocrine  [] Heme/Lymph  [] Allergic/Immunologic    Explanation:     MEDICATIONS:    Current Facility-Administered Medications:     hydrocortisone 1 % cream, , Topical, BID, MICHELLE Fang Junior, CNP, Given at 01/09/23 2134    acetaminophen (TYLENOL) tablet 650 mg, 650 mg, Oral, Q4H PRN, Trace Romero MD    magnesium hydroxide (MILK OF MAGNESIA) 400 MG/5ML suspension 30 mL, 30 mL, Oral, Daily PRN, Trace Romero MD    aluminum & magnesium hydroxide-simethicone (MAALOX) 200-200-20 MG/5ML suspension 30 mL, 30 mL, Oral, PRN, Trace Romero MD    haloperidol (HALDOL) tablet 5 mg, 5 mg, Oral, Q6H PRN **OR** haloperidol lactate (HALDOL) injection 5 mg, 5 mg, IntraMUSCular, Q6H PRN, Trace Romero MD    benztropine mesylate (COGENTIN) injection 2 mg, 2 mg, IntraMUSCular, BID PRN, Trace Romero MD    traZODone (DESYREL) tablet 50 mg, 50 mg, Oral, Nightly PRN, Trace Romero MD    hydrOXYzine (VISTARIL) injection 50 mg, 50 mg, IntraMUSCular, Q6H PRN **OR** hydrOXYzine pamoate (VISTARIL) capsule 50 mg, 50 mg, Oral, Q6H PRN, Trace Romero MD    mirtazapine (REMERON) tablet 15 mg, 15 mg, Oral, Nightly, Trace Romero MD, 15 mg at 01/09/23 2134      Examination:  /71   Pulse 82   Temp 97.5 °F (36.4 °C)   Resp 18   Ht 5' 7\" (1.702 m)   Wt 130 lb (59 kg)   SpO2 100%   BMI 20.36 kg/m²   Gait - steady  Medication side effects(SE): denies     Mental Status Examination:    Level of consciousness:  within normal limits   Appearance:  good grooming and good hygiene  Behavior/Motor:  no abnormalities noted  Attitude toward examiner:  cooperative, attentive, and good eye contact  Speech:  spontaneous, normal rate, and normal volume   Mood: euthymic, intermittently sad  Affect:  mood congruent  Thought processes:  linear and goal directed   Thought content:  Suicidal Ideation:  denies suicidal ideation  Delusions:  no evidence of delusions  Perceptual Disturbance:  denies any perceptual disturbance  Cognition:  oriented to person, place, and time   Concentration intact  Insight good   Judgement good     ASSESSMENT:   Patient symptoms are:  [] Well controlled  [x] Improving  [] Worsening  [] No change      Diagnosis:   Principal Problem:    Depression, unspecified depression type  Resolved Problems:    * No resolved hospital problems. *      LABS:    No results for input(s): WBC, HGB, PLT in the last 72 hours. No results for input(s): NA, K, CL, CO2, BUN, CREATININE, GLUCOSE in the last 72 hours. No results for input(s): BILITOT, ALKPHOS, AST, ALT in the last 72 hours. Lab Results   Component Value Date/Time    LABAMPH Neg 01/07/2023 07:21 AM    BARBSCNU Neg 01/07/2023 07:21 AM    LABBENZ Neg 01/07/2023 07:21 AM    LABMETH Neg 01/07/2023 07:21 AM    OPIATESCREENURINE Neg 01/07/2023 07:21 AM    PHENCYCLIDINESCREENURINE Neg 01/07/2023 07:21 AM    ETOH 93 01/07/2023 07:04 AM     Lab Results   Component Value Date/Time    TSH 1.300 01/07/2023 07:04 AM     No results found for: LITHIUM  No results found for: VALPROATE, CBMZ    RISK ASSESSMENT: suicide high    Treatment Plan:  Reviewed current Medications with the patient. Monitoring efficacy and tolerability of medication regime   Risks, benefits, side effects, drug-to-drug interactions and alternatives to treatment were discussed. Collateral information: boyfriend  CD evaluation denies  Encourage patient to attend group and other milieu activities. Discharge planning discussed with the patient and treatment team; tentative plan to DC tomorrow to home with OP services     PSYCHOTHERAPY/COUNSELING:  [x] Therapeutic interview  [x] Supportive  [] CBT  [] Ongoing  [] Other    [x] Patient continues to need, on a daily basis, active treatment furnished directly by or requiring the supervision of inpatient psychiatric personnel      Anticipated Length of stay: 1 days         COSIGN : yes     Electronically signed by MICHELLE Menjivar CNP on 1/10/2023 at 1:10 PM     Addendum:  Ptient seen with NP after attending the team meeting, discussing the patient with the nursing and social work staff. I participated during the interview process as well as the treatment plan and spent more than 70% of the time with the nurse practitioner helping me in documentation.   I agree with the above documentation of clinical finding and treatment plan    Physician Signature: Electronically signed by Vinicius Vazquez MD on 1/10/2023 at 5:53 PM

## 2023-01-10 NOTE — PROGRESS NOTES
Patient did not attend group despite staff encouragement.  Electronically signed by Mahin Orlando on 1/10/2023 at 3:09 PM

## 2023-01-10 NOTE — PLAN OF CARE
Problem: Self Harm/Suicidality  Goal: Will have no self-injury during hospital stay  Description: INTERVENTIONS:  1. Ensure constant observer at bedside with Q15M safety checks  2. Maintain a safe environment  3. Secure patient belongings  4. Ensure family/visitors adhere to safety recommendations  5. Ensure safety tray has been added to patient's diet order  6. Every shift and PRN: Re-assess suicidal risk via Frequent Screener    Outcome: Progressing  Flowsheets (Taken 1/10/2023 1239)  Will have no self-injury during hospital stay: Maintain a safe environment     Problem: Pain  Goal: Verbalizes/displays adequate comfort level or baseline comfort level  Outcome: Progressing     Problem: Anxiety  Goal: Will report anxiety at manageable levels  Description: INTERVENTIONS:  1. Administer medication as ordered  2. Teach and rehearse alternative coping skills  3. Provide emotional support with 1:1 interaction with staff  Outcome: Progressing  Flowsheets (Taken 1/10/2023 1239)  Will report anxiety at manageable levels:   Provide emotional support with 1:1 interaction with staff   Teach and rehearse alternative coping skills     Problem: Coping  Goal: Pt/Family able to verbalize concerns and demonstrate effective coping strategies  Description: INTERVENTIONS:  1. Assist patient/family to identify coping skills, available support systems and cultural and spiritual values  2. Provide emotional support, including active listening and acknowledgement of concerns of patient and caregivers  3. Reduce environmental stimuli, as able  4. Instruct patient/family in relaxation techniques, as appropriate  5.  Assess for spiritual pain/suffering and initiate Spiritual Care, Psychosocial Clinical Specialist consults as needed  Outcome: Progressing  Flowsheets (Taken 1/10/2023 1238)  Patient/family able to verbalize anxieties, fears, and concerns, and demonstrate effective coping: Reduce environmental stimuli, as able     Problem: Death & Dying  Goal: Pt/Family communicate acceptance of impending death and feel psychological comfort and peace  Description: INTERVENTIONS:  1. Assess patient/family anxiety and grief process related to end of life issues  2. Provide emotional and spiritual support  3. Provide information about the patient's health status with consideration of family and cultural values  4. Communicate willingness to discuss death and facilitate grief process  with patient/family as appropriate  5. Emphasize sustaining relationships within family system and community, or rose/spiritual traditions  6. Initiate Spiritual Care, Psychosocial Clinical Specialist, consult as needed  Outcome: Progressing  Flowsheets (Taken 1/10/2023 1239)  Patient/family communicates acceptance of loss or impending death and feels physical/psychological comfort and peace: [address], Psychosocial Clinical Specialist, consult as needed     Problem: Decision Making  Goal: Pt/Family able to effectively weigh alternatives and participate in decision making related to treatment and care  Description: INTERVENTIONS:  1. Determine when there are differences between patient's view, family's view, and healthcare provider's view of condition  2. Facilitate patient and family articulation of goals for care  3. Help patient and family identify pros/cons of alternative solutions  4. Provide information as requested by patient/family  5. Respect patient/family right to receive or not to receive information  6. Serve as a liaison between patient and family and health care team  7.  Initiate Consults from Ethics, Palliative Care or initiate [address] [place] [address] as is appropriate  Outcome: Progressing  Flowsheets (Taken 1/10/2023 1239)  Patient/family able to effectively weigh alternatives and participate in decision making related to treatment and care: Respect patient/family right to receive or not to receive information     Problem: Depression/Self Harm  Goal: Effect of psychiatric condition will be minimized and patient will be protected from self harm  Description: INTERVENTIONS:  1. Assess impact of patient's symptoms on level of functioning, self care needs and offer support as indicated  2. Assess patient/family knowledge of depression, impact on illness and need for teaching  3. Provide emotional support, presence and reassurance  4. Assess for possible suicidal thoughts or ideation. If patient expresses suicidal thoughts or statements do not leave alone, initiate Suicide Precautions, move to a room close to the nursing station and obtain sitter  5. Initiate consults as appropriate with Mental Health Professional, Spiritual Care, Psychosocial CNS, and consider a recommendation to the LIP for a Psychiatric Consultation  Outcome: Progressing  Flowsheets (Taken 1/10/2023 1238)  Effect of psychiatric condition will be minimized and patient will be protected from self harm: Provide emotional support, presence and reassurance     Problem: Involuntary Admit  Goal: Will cooperate with staff recommendations and doctor's orders and will demonstrate appropriate behavior  Description: INTERVENTIONS:  1. Treat underlying conditions and offer medication as ordered  2. Educate regarding involuntary admission procedures and rules  3.  Contain excessive/inappropriate behavior per unit and hospital policies  Outcome: Progressing  Flowsheets (Taken 1/10/2023 1232)  Will cooperate with staff recommendations and doctor's orders and will demonstrate appropriate behavior: Treat underlying conditions and offer medication as ordered     Problem: Risk for Elopement  Goal: Patient will not exit the unit/facility without proper excort  Outcome: Progressing  Flowsheets (Taken 1/10/2023 1232)  Nursing Interventions for Elopement Risk:   Make sure patient has all necessary personal care items   Reduce environmental triggers

## 2023-01-10 NOTE — PROGRESS NOTES
Pt. refused to attend the 1000 skills group, despite staff encouragement.  Electronically signed by Mignon Phillip, [address] on 1/10/2023 at 11:24 AM

## 2023-01-10 NOTE — PROGRESS NOTES
Pt. declined to attend the 0900 community meeting, despite staff encouragement.  Goal - \"To go home\" Electronically signed by NIECY Arriola on 1/10/2023 at 11:24 AM

## 2023-01-11 VITALS
WEIGHT: 130 LBS | BODY MASS INDEX: 20.4 KG/M2 | TEMPERATURE: 98.3 F | OXYGEN SATURATION: 100 % | HEART RATE: 75 BPM | HEIGHT: 67 IN | SYSTOLIC BLOOD PRESSURE: 117 MMHG | RESPIRATION RATE: 18 BRPM | DIASTOLIC BLOOD PRESSURE: 60 MMHG

## 2023-01-11 PROBLEM — F32.2 CURRENT SEVERE EPISODE OF MAJOR DEPRESSIVE DISORDER WITHOUT PSYCHOTIC FEATURES WITHOUT PRIOR EPISODE (HCC): Status: ACTIVE | Noted: 2023-01-07

## 2023-01-11 RX ORDER — MIRTAZAPINE 15 MG/1
15 TABLET, FILM COATED ORAL NIGHTLY
Qty: 15 TABLET | Refills: 3 | Status: SHIPPED | OUTPATIENT
Start: 2023-01-11

## 2023-01-11 NOTE — DISCHARGE INSTRUCTIONS
Keep all follow up appointments, take medications as ordered, utilize positive supports, abstain from use of alcohol and drugs. If symptoms return or you feel at risk to yourself or others, please call 911, return the nearest emergency room, or call your local crisis hotline:  [facility]: [phone] [place]: 1(832) [address]: [phone]     Due to the [address] Smoking Cessation Group is not currently available. For assistance with quitting smoking please go to https://smokefree.gov. A prescription for an FDA-approved tobacco cessation medication was offered at discharge and the patient refused.      Pt refused flu vaccine

## 2023-01-11 NOTE — PROGRESS NOTES
Pt. refused to attend the 1000 skills group, despite staff encouragement.  Electronically signed by Brittni Figueroa, [address] on 1/11/2023 at 12:03 PM

## 2023-01-11 NOTE — DISCHARGE INSTR - DIET

## 2023-01-11 NOTE — PROGRESS NOTES
CLINICAL PHARMACY NOTE: MEDS TO BEDS    Total # of Prescriptions Filled: 1   The following medications were delivered to the patient:  Mirtazapine 15 mg Tab    Additional Documentation:

## 2023-01-11 NOTE — GROUP NOTE
Group Therapy Note    Date: 1/10/2023    Group Start Time: 2000  Group End Time: 2030  Group Topic: Recreational    [facility]    Linda Zamora RN        Group Therapy Note    Attendees: 10       Patient's Goal:  To attend recreational group     Notes:  Goal was met    Status After Intervention:  Unchanged    Participation Level:  Active Listener    Participation Quality: Appropriate      Speech:  normal      Thought Process/Content: Logical      Affective Functioning: Congruent      Mood: euthymic      Level of consciousness:  Alert      Response to Learning: Progressing to goal      Endings: None Reported    Modes of Intervention: Socialization, Activity, and Movement      Discipline Responsible: Registered Nurse      Signature:  Linda Zamora RN

## 2023-01-11 NOTE — CARE COORDINATION
Discharge Instructions reviewed verbally and in writing, including follow up appointments. Patient verbalizes understanding and signed as such. All belongings returned for discharge. Patient provided with food and drug interaction booklet. Patient denies SI, HI, and A/V Hallucinations. Mood is stable.

## 2023-01-11 NOTE — GROUP NOTE
Group Therapy Note    Date: 1/10/2023    Group Start Time: 2130  Group End Time: 2145  Group Topic: Wrap-Up    [facility]    Macarena Hummel RN        Group Therapy Note    Attendees: 7       Patient's Goal:  To attend wrap up group    Notes:  Goal met    Status After Intervention:  Unchanged    Participation Level:  Active Listener    Participation Quality: Appropriate      Speech:  normal      Thought Process/Content: Logical      Affective Functioning: Congruent      Mood: euthymic      Level of consciousness:  Alert      Response to Learning: Progressing to goal      Endings: None Reported    Modes of Intervention: Exploration and Problem-solving      Discipline Responsible: Registered Nurse      Signature:  Macarena Hummel RN

## 2023-01-11 NOTE — PROGRESS NOTES
Morning [address] attended the morning community meeting on 1/11/23. Topics discussed today     [x] Introduction  Day of the week and date  Mask distribution  Current mask requirements  [x]Teams  Explanation of  Green and Blue team criteria  Nurses assigned to each team for today  Explanation about green and blue paper  Date  Patient's Name  Patient's Nurse  Goals  [x] Visitation  Announce the visiting hours for the day  Announce which team is allowed to have visitors for the day  Review any updated Covid 19 requirements for visitors during visitation  Vaccine Card or negative Covid test within 48 hours of visit  State Identification  Patients are reminded to alert the  at least 1 hour before visitation   [x] Unit Orientation  Coffee use  Phone location and etiquette  Shower locations  [place] and dryer location and process  Common area expectations  Staff rounds expectation  [x] Meals   Educate patient to the menu  The patient is encouraged to fill out the menu to get preferences at mealtime  The patient is educated that if they do not fill out the menu, they will get the standard tray  The coffee pot is decaf, patient encouraged to order regular coffee from menu.   Educate patient to the meal process  Patient encouraged to eat snacks provided twice daily  Snacks may stay in patient room     [x] Discharge Process  Discharge expectations  Fill out the survey after discharge   [x] Hygiene  Daily showers encouraged  Showers availability discussed   Daily dressing encouraged  Discussed wearing street clothing  Education provided on where to place linens and clothing  Linens in the hamper  personal clothing does not go into the linen hamper  [x] Group   Patient encouraged to attend group provided  Time of Group Meetings discussed  Gentle reminder that attendance is a Physician order  [x] Movement  Chair exercises completed  Stretching completed  Notes: Goal - \"To go home\" Electronically signed by Didi Landrum, [address] on 1/11/2023 at 10:00 AM

## 2023-01-11 NOTE — DISCHARGE SUMMARY
DISCHARGE SUMMARY      Patient ID:  Adrian Singh  [id number]  60 y.o.  1995      Admit date: 2023    Discharge date and time: 2023    Admitting Physician: Karen Soliz MD     Discharge Physician: Dr Enzo Trimble MD    Admission Diagnoses: Depression with suicidal ideation [F32. A, F05.037]  Acute alcoholic intoxication without complication ([facility] [address] [F60.151]  Depression, unspecified depression type [F32. A]    Admission Condition: poor    Discharged Condition: stable    Admission Circumstance: CHIEF COMPLAINT:  Depression SI     History obtained from:  patient     Patient was seen after discussing with the treatment team and reviewing the chart           CIRCUMSTANCES OF ADMISSION:      Patient presents to the ED by squad after she was taken by her boyfriend to [facility] after an interrupted suicide attempt. Patient admits she barricaded herself in the bathroom and planned to overdose on blood pressure pills in the medicine cabinet until her boyfriend broke down the door to stop her. She admits she has been experiencing symptoms of depression since her father passed in December and has been having thoughts to harm herself for the past three days. States \"overdosing to kill myself would just bee the easiest way. \" She verbalizes she has been having difficulty functioning due to her symptoms. She reports she has been isolating and has been feeling more angry lately. States \"I just cant' be without my Dad, he was all I had in my life. \" States \"I don't think I have slept since he . \" Reports difficulty falling and staying asleep. Patient states she has no appetite and has barely been eating. Patient cooperative with assessment and answers questions appropriatley with organized thought process. Mood depression with flat affect. Tearful frequently during assessment. Speech soft and quiet. Denies HI and denies A/V hallucinations.       HISTORY OF PRESENT ILLNESS:       The patient is a 24 y.o. female single live with BF, 4 children- 5,7,2,3 y/o unemployed with no significant past history     Pt has been feeling depressed since the death of her dad  Poor sleep with EMW at 4 AM and not sleep in general  Severity: Rating mood to be around 2/10 (10- good)  Quality:melancholic  Worse in the morning  Content: Hopeless, worthless and helpless feeling  Suicidal thoughts - thinking about overdose, barricaded herself in the bathroom want to take overdose with BP pills  Associated symptoms:  Poor concentration, anhedonia, decrease motivation  Sleep and appetite- poor     Stressors: Lost her dad last month, was her main carer. The patient is not currently receiving care for the above psychiatric illness.      Medications Prior to Admission:   Prescriptions Prior to Admission   Medications Prior to Admission: propranolol (INDERAL LA) 80 MG extended release capsule, Take 2 capsules by mouth daily        Compliance:n/a     Psychiatric Review of Systems       Depression: yes     Rosa or Hypomania:  no     Panic Attacks:  no     Phobias:  no     Obsessions and Compulsions:  no     PTSD : no     Hallucinations:  no     Delusions:  no     Substance Abuse History:  ETOH: no   Marijuana: no  Opiates: no  Other Drugs: no        Past Psychiatric History:  Prior Diagnosis:  no  Psychiatrist: no  Therapist:no  Hospitalization: no  Hx of Suicidal Attempts: no  Hx of violence:  no  ECT: no  Previous discontinued Psychiatric Med Trials: no      PAST MEDICAL/PSYCHIATRIC HISTORY:   Past Medical History:   Diagnosis Date    Depression, unspecified depression type 2023    Gestational hypertension, third trimester      uterine contractions in third trimester, antepartum 10/5/2016       FAMILY/SOCIAL HISTORY:  Family History   Problem Relation Age of Onset    Diabetes Father      Social History     Socioeconomic History    Marital status: Single     Spouse name: Not on file    Number of children: Not on file    Years of education: Not on file    Highest education level: Not on file   Occupational History    Not on file   Tobacco Use    Smoking status: Every Day     Types: Cigars    Smokeless tobacco: Never    Tobacco comments:     black n milds   Vaping Use    Vaping Use: Never used   Substance and Sexual Activity    Alcohol use: Yes     Comment: occas    Drug use: No    Sexual activity: Yes     Partners: Male   Other Topics Concern    Not on file   Social History Narrative    Not on file     Social Determinants of Health     Financial Resource Strain: Not on file   Food Insecurity: Not on file   Transportation Needs: Not on file   Physical Activity: Not on file   Stress: Not on file   Social Connections: Not on file   Intimate Partner Violence: Not on file   Housing Stability: Not on file       MEDICATIONS:    Current Facility-Administered Medications:     hydrocortisone 1 % cream, , Topical, BID, MICHELLE Koehler - CNP, Given at 01/09/23 2134    acetaminophen (TYLENOL) tablet 650 mg, 650 mg, Oral, Q4H PRN, Anastasia Huff MD    magnesium hydroxide (MILK OF MAGNESIA) 400 MG/5ML suspension 30 mL, 30 mL, Oral, Daily PRN, Anastasia Huff MD    aluminum & magnesium hydroxide-simethicone (MAALOX) 200-200-20 MG/5ML suspension 30 mL, 30 mL, Oral, PRN, Anastasia Huff MD    haloperidol (HALDOL) tablet 5 mg, 5 mg, Oral, Q6H PRN **OR** haloperidol lactate (HALDOL) injection 5 mg, 5 mg, IntraMUSCular, Q6H PRN, Anastasia Huff MD    benztropine mesylate (COGENTIN) injection 2 mg, 2 mg, IntraMUSCular, BID PRN, Anastasia Huff MD    traZODone (DESYREL) tablet 50 mg, 50 mg, Oral, Nightly PRN, Anastasia Huff MD    hydrOXYzine (VISTARIL) injection 50 mg, 50 mg, IntraMUSCular, Q6H PRN **OR** hydrOXYzine pamoate (VISTARIL) capsule 50 mg, 50 mg, Oral, Q6H PRN, Anastasia Huff MD    mirtazapine (REMERON) tablet 15 mg, 15 mg, Oral, Nightly, Anastasia Huff MD, 15 mg at 01/10/23 2101    Examination:  /60   Pulse 75   Temp 98.3 °F (36.8 °C) (Oral) Resp 18   Ht 5' 7\" (1.702 m)   Wt 130 lb (59 kg)   SpO2 100%   BMI 20.36 kg/m²   Gait - steady    HOSPITAL COURSE[de-identified]  Following admission to the hospital, patient had a complete physical exam and blood work up, which was unremarkable  Patient was monitored closely with suicide precaution  Patient was started on see medication list below   Was encouraged to participate in group and other milieu activity  Patient started to feel better with this combination of treatment. Significant progress in the symptoms since admission; pt tolerated treatment regime well    Mood better, with the score of 0/10 - bad  Denies AVH or paranoid thoughts  Denies Hopeless or worthless feeling  No active SI/HI  Appetite:  [x] Normal  [] Increased  [] Decreased    Sleep:       [x] Normal  [] Fair       [] Poor            Energy:    [x] Normal  [] Increased  [] Decreased     SI [] Present  [x] Absent  HI  []Present  [x] Absent   Aggression:  [] yes  [] no  Patient is [x] able  [] unable to CONTRACT FOR SAFETY   Medication side effects(SE):  [x] None(Psych.  Meds.) [] Other      Mental Status Examination on discharge:    Level of consciousness:  within normal limits   Appearance:  well-appearing  Behavior/Motor:  no abnormalities noted  Attitude toward examiner:  attentive and good eye contact  Speech:  spontaneous, normal rate and normal volume   Mood: euthymic  Affect:  mood congruent  Thought processes:  linear and goal directed   Thought content:  Suicidal Ideation:  denies suicidal ideation  Delusions:  no evidence of delusions  Perceptual Disturbance:  denies any perceptual disturbance  Cognition:  oriented to person, place, and time   Concentration intact  Memory intact  Insight good   Judgement fair   Fund of Knowledge adequate      ASSESSMENT:  Patient symptoms are:  [x] Well controlled  [x] Improving  [] Worsening  [] No change      Diagnosis:  Principal Problem:    Current severe episode of major depressive disorder without psychotic features without prior episode [facility])  Resolved Problems:    * No resolved hospital problems. *      LABS:    No results for input(s): WBC, HGB, PLT in the last 72 hours. No results for input(s): NA, K, CL, CO2, BUN, CREATININE, GLUCOSE in the last 72 hours. No results for input(s): BILITOT, ALKPHOS, AST, ALT in the last 72 hours. Lab Results   Component Value Date/Time    LABAMPH Neg 01/07/2023 07:21 AM    BARBSCNU Neg 01/07/2023 07:21 AM    LABBENZ Neg 01/07/2023 07:21 AM    LABMETH Neg 01/07/2023 07:21 AM    OPIATESCREENURINE Neg 01/07/2023 07:21 AM    PHENCYCLIDINESCREENURINE Neg 01/07/2023 07:21 AM    ETOH 93 01/07/2023 07:04 AM     Lab Results   Component Value Date/Time    TSH 1.300 01/07/2023 07:04 AM     No results found for: LITHIUM  No results found for: VALPROATE, CBMZ    RISK ASSESSMENT AT DISCHARGE: Low risk for suicide and homicide. Treatment Plan:  Reviewed current Medications with the patient. Education provided on the complaince with treatment. Risks, benefits, side effects, drug-to-drug interactions and alternatives to treatment were discussed. Encourage patient to attend outpatient follow up appointment and therapy. Patient was advised to call the outpatient provider, visit the nearest ED or call 911 if symptoms are not manageable. Patient's family member was contacted prior to the discharge.          Medication List        START taking these medications      mirtazapine 15 MG tablet  Commonly known as: REMERON  Take 1 tablet by mouth nightly            STOP taking these medications      propranolol 80 MG extended release capsule  Commonly known as: INDERAL LA               Where to Get Your Medications        These medications [address]  [address] [place] [address], [address] [place] [address]      Phone: [phone]   mirtazapine 15 MG tablet           Reason for more than one antipsychotic: [x] N/A  [] 3 failed monotherapy(drugs tried):  [] Cross over to a new antipsychotic  [] Taper to monotherapy from polypharmacy  [] Augmentation of Clozapine therapy due to treatment resistance to single therapy        TIME SPEND - 35 MINUTES TO COMPLETE THE EVALUATION, DISCHARGE SUMMARY, MEDICATION RECONCILIATION AND FOLLOW UP CARE     Signed:  MICHELLE Sevilla - CNP  1/11/2023  9:21 AM    Addendum:  Claudia Giron seen with NP after attending the team meeting, discussing the patient with the nursing and social work staff. I participated during the interview process as well as the treatment plan and spent more than 70% of the time with the nurse practitioner helping me in documentation.   I agree with the above documentation of clinical finding and treatment plan    Physician Signature: Electronically signed by Jefferson Alexis MD on 1/11/2023 at 9:34 AM

## 2023-01-11 NOTE — CARE COORDINATION
Group Therapy Note    Date: 1/11/2023  Start Time: 1300  End Time:  1330    Number of Participants: 10    Type of Group: Cognitive Skills    Patient's Goal:  To participate in cognitive skills group. Notes: Patient declined to attend psychoeducation group at 1300 despite encouragement by staff.      Discipline Responsible: /Counselor    ARAMIS Alegre

## 2023-01-11 NOTE — PLAN OF CARE
Pt visible on unit throughout shift. Appears euthymic with congruent affect. Denies all reports of SI/HI or AVH. Denies depression and anxiety. Makes future oriented comments about d/c and returning home. Problem: Self Harm/Suicidality  Goal: Will have no self-injury during hospital stay  Description: INTERVENTIONS:  1. Ensure constant observer at bedside with Q15M safety checks  2. Maintain a safe environment  3. Secure patient belongings  4. Ensure family/visitors adhere to safety recommendations  5. Ensure safety tray has been added to patient's diet order  6. Every shift and PRN: Re-assess suicidal risk via Frequent Screener    Outcome: Progressing     Problem: Pain  Goal: Verbalizes/displays adequate comfort level or baseline comfort level  Outcome: Progressing     Problem: Anxiety  Goal: Will report anxiety at manageable levels  Description: INTERVENTIONS:  1. Administer medication as ordered  2. Teach and rehearse alternative coping skills  3. Provide emotional support with 1:1 interaction with staff  Outcome: Progressing     Problem: Coping  Goal: Pt/Family able to verbalize concerns and demonstrate effective coping strategies  Description: INTERVENTIONS:  1. Assist patient/family to identify coping skills, available support systems and cultural and spiritual values  2. Provide emotional support, including active listening and acknowledgement of concerns of patient and caregivers  3. Reduce environmental stimuli, as able  4. Instruct patient/family in relaxation techniques, as appropriate  5. Assess for spiritual pain/suffering and initiate Spiritual Care, Psychosocial Clinical Specialist consults as needed  Outcome: Progressing     Problem: Death & Dying  Goal: Pt/Family communicate acceptance of impending death and feel psychological comfort and peace  Description: INTERVENTIONS:  1. Assess patient/family anxiety and grief process related to end of life issues  2.  Provide emotional and spiritual support  3. Provide information about the patient's health status with consideration of family and cultural values  4. Communicate willingness to discuss death and facilitate grief process  with patient/family as appropriate  5. Emphasize sustaining relationships within family system and community, or rose/spiritual traditions  6. Initiate Spiritual Care, Psychosocial Clinical Specialist, consult as needed  Outcome: Progressing     Problem: Decision Making  Goal: Pt/Family able to effectively weigh alternatives and participate in decision making related to treatment and care  Description: INTERVENTIONS:  1. Determine when there are differences between patient's view, family's view, and healthcare provider's view of condition  2. Facilitate patient and family articulation of goals for care  3. Help patient and family identify pros/cons of alternative solutions  4. Provide information as requested by patient/family  5. Respect patient/family right to receive or not to receive information  6. Serve as a liaison between patient and family and health care team  7. Initiate Consults from Ethics, Palliative Care or initiate [address] as is appropriate  Outcome: Progressing     Problem: Depression/Self Harm  Goal: Effect of psychiatric condition will be minimized and patient will be protected from self harm  Description: INTERVENTIONS:  1. Assess impact of patient's symptoms on level of functioning, self care needs and offer support as indicated  2. Assess patient/family knowledge of depression, impact on illness and need for teaching  3. Provide emotional support, presence and reassurance  4. Assess for possible suicidal thoughts or ideation. If patient expresses suicidal thoughts or statements do not leave alone, initiate Suicide Precautions, move to a room close to the nursing station and obtain sitter  5.  Initiate consults as appropriate with Mental Health Professional, Spiritual Care, Psychosocial CNS, and consider a recommendation to the [facility] for a Psychiatric Consultation  Outcome: Progressing     Problem: Involuntary Admit  Goal: Will cooperate with staff recommendations and doctor's orders and will demonstrate appropriate behavior  Description: INTERVENTIONS:  1. Treat underlying conditions and offer medication as ordered  2. Educate regarding involuntary admission procedures and rules  3.  Contain excessive/inappropriate behavior per unit and hospital policies  Outcome: Progressing     Problem: Risk for Elopement  Goal: Patient will not exit the unit/facility without proper excort  Outcome: Progressing